# Patient Record
Sex: MALE | Race: WHITE | Employment: OTHER | URBAN - NONMETROPOLITAN AREA
[De-identification: names, ages, dates, MRNs, and addresses within clinical notes are randomized per-mention and may not be internally consistent; named-entity substitution may affect disease eponyms.]

---

## 2017-02-23 ENCOUNTER — TELEPHONE (OUTPATIENT)
Dept: CARDIOLOGY | Age: 47
End: 2017-02-23

## 2017-08-17 ENCOUNTER — TELEPHONE (OUTPATIENT)
Dept: PULMONOLOGY | Age: 47
End: 2017-08-17

## 2017-09-06 ENCOUNTER — NURSE TRIAGE (OUTPATIENT)
Dept: ADMINISTRATIVE | Age: 47
End: 2017-09-06

## 2017-10-26 DIAGNOSIS — A08.4 VIRAL GASTROENTERITIS: ICD-10-CM

## 2017-10-26 DIAGNOSIS — R10.13 DYSPEPSIA: ICD-10-CM

## 2017-10-26 DIAGNOSIS — J44.9 COPD (CHRONIC OBSTRUCTIVE PULMONARY DISEASE) (HCC): ICD-10-CM

## 2017-10-26 DIAGNOSIS — F31.9 BIPOLAR DISORDER (HCC): ICD-10-CM

## 2017-10-26 RX ORDER — ONDANSETRON 4 MG/1
4 TABLET, FILM COATED ORAL EVERY 8 HOURS PRN
Qty: 20 TABLET | Refills: 0 | OUTPATIENT
Start: 2017-10-26

## 2017-10-26 RX ORDER — ARIPIPRAZOLE 10 MG/1
10 TABLET ORAL NIGHTLY
Qty: 30 TABLET | Refills: 2 | OUTPATIENT
Start: 2017-10-26

## 2017-10-26 RX ORDER — ALBUTEROL SULFATE 90 UG/1
2 AEROSOL, METERED RESPIRATORY (INHALATION) EVERY 4 HOURS PRN
Qty: 1 INHALER | Refills: 11 | OUTPATIENT
Start: 2017-10-26

## 2017-10-26 RX ORDER — PANTOPRAZOLE SODIUM 40 MG/1
40 TABLET, DELAYED RELEASE ORAL
Qty: 14 TABLET | Refills: 0 | OUTPATIENT
Start: 2017-10-26

## 2017-11-02 ENCOUNTER — HOSPITAL ENCOUNTER (OUTPATIENT)
Dept: PULMONOLOGY | Age: 47
Discharge: HOME | End: 2017-11-02
Payer: MEDICARE

## 2017-11-02 DIAGNOSIS — J43.2 CENTRILOBULAR EMPHYSEMA (HCC): ICD-10-CM

## 2017-11-02 PROCEDURE — 94060 EVALUATION OF WHEEZING: CPT

## 2017-11-02 PROCEDURE — 94726 PLETHYSMOGRAPHY LUNG VOLUMES: CPT

## 2017-11-02 PROCEDURE — 94729 DIFFUSING CAPACITY: CPT

## 2017-11-08 ENCOUNTER — OFFICE VISIT (OUTPATIENT)
Dept: PULMONOLOGY | Age: 47
End: 2017-11-08
Payer: MEDICAID

## 2017-11-08 VITALS
HEIGHT: 66 IN | SYSTOLIC BLOOD PRESSURE: 92 MMHG | HEART RATE: 76 BPM | BODY MASS INDEX: 17.36 KG/M2 | OXYGEN SATURATION: 98 % | WEIGHT: 108 LBS | DIASTOLIC BLOOD PRESSURE: 62 MMHG | TEMPERATURE: 98.1 F

## 2017-11-08 DIAGNOSIS — J41.1 MUCOPURULENT CHRONIC BRONCHITIS (HCC): Primary | ICD-10-CM

## 2017-11-08 DIAGNOSIS — J45.40 MODERATE PERSISTENT ASTHMA WITHOUT COMPLICATION: ICD-10-CM

## 2017-11-08 PROCEDURE — 99213 OFFICE O/P EST LOW 20 MIN: CPT | Performed by: PHYSICIAN ASSISTANT

## 2017-11-08 RX ORDER — ALBUTEROL SULFATE 90 UG/1
2 AEROSOL, METERED RESPIRATORY (INHALATION) EVERY 4 HOURS PRN
Qty: 1 INHALER | Refills: 11 | Status: SHIPPED | OUTPATIENT
Start: 2017-11-08 | End: 2018-06-21 | Stop reason: SDUPTHER

## 2017-11-08 RX ORDER — BUDESONIDE AND FORMOTEROL FUMARATE DIHYDRATE 80; 4.5 UG/1; UG/1
2 AEROSOL RESPIRATORY (INHALATION) 2 TIMES DAILY
Qty: 1 INHALER | Refills: 11 | Status: SHIPPED | OUTPATIENT
Start: 2017-11-08 | End: 2018-06-21 | Stop reason: DRUGHIGH

## 2017-11-08 ASSESSMENT — ENCOUNTER SYMPTOMS
SHORTNESS OF BREATH: 1
EYES NEGATIVE: 1
COUGH: 1
HEMOPTYSIS: 0
NAUSEA: 0
WHEEZING: 1
SPUTUM PRODUCTION: 0
HEARTBURN: 0
SORE THROAT: 0
BACK PAIN: 0
VOMITING: 0
GASTROINTESTINAL NEGATIVE: 1

## 2017-11-08 NOTE — PROGRESS NOTES
[facility]    Patient: Elliotteva Hull, 49 y.o.   : 1970    Pt of Dr. Prince Arreola   Patient presents with    Shortness of Breath     emphysema 1 year f/u with pft 17        HPI  Luis Felipe Frankel is here for follow up for COPD. Last visit was a year ago. He is more SOB and wheezing. He is taking Spiriva with good benefit. He gets benefit from albuterol. Weight is still up and down. He is no longer following at [facility] for weight loss. He has not smoked in over a year. Progress History:   Since last visit any new medical issues? No  New ER or hospitlal visits? No  Any new or changes in medicines? No  Using inhalers? Yes Spiriva  Are they helpful?  Yes   Past Medical hx   PMH:  Past Medical History:   Diagnosis Date    Bipolar 1 [facility] ([facility] [address]     COPD (chronic obstructive pulmonary disease) (HCC)     GERD (gastroesophageal reflux disease)     IBS (irritable bowel syndrome)     Lung collapse     Left     SURGICAL HISTORY:  Past Surgical History:   Procedure Laterality Date    AORTO-SMA BYPASS GRAFT      CHOLECYSTECTOMY      COLONOSCOPY      ENDOSCOPY, COLON, DIAGNOSTIC      TONSILLECTOMY       SOCIAL HISTORY:  Social History   Substance Use Topics    Smoking status: Former Smoker     Packs/day: 0.50     Years: 36.00     Types: Cigarettes     Start date: 3/23/1975     Quit date: 10/6/2016    Smokeless tobacco: Never Used    Alcohol use No     ALLERGIES:No Known Allergies  FAMILY HISTORY:  Family History   Problem Relation Age of Onset    Liver Cancer Father     Cancer Father     Heart Disease Mother      CURRENT MEDICATIONS:  Current Outpatient Prescriptions   Medication Sig Dispense Refill    budesonide-formoterol (SYMBICORT) 80-4.5 MCG/ACT AERO Inhale 2 puffs into the lungs 2 times daily 1 Inhaler 11    tiotropium (SPIRIVA HANDIHALER) 18 MCG inhalation capsule Inhale 1 capsule into the lungs daily 30 capsule 11  albuterol sulfate HFA (PROAIR HFA) 108 (90 Base) MCG/ACT inhaler Inhale 2 puffs into the lungs every 4 hours as needed for Wheezing or Shortness of Breath 1 Inhaler 11    pantoprazole (PROTONIX) 40 MG tablet Take 1 tablet by mouth daily (with breakfast) 14 tablet 0    ondansetron (ZOFRAN) 4 MG tablet Take 1 tablet by mouth every 8 hours as needed for Nausea or Vomiting 20 tablet 0    diltiazem (CARDIZEM) 30 MG tablet Take 30 mg in morning then  titrate up to 30 mg twice daily      Acetaminophen 650 MG TABS Take 650 mg by mouth every 4 hours as needed. 120 tablet     metoclopramide (REGLAN) 5 MG tablet Take 5 mg by mouth 3 times daily (before meals).  amitriptyline (ELAVIL) 10 MG tablet Take 10 mg by mouth nightly.  vitamin E 400 UNIT capsule Take 400 Units by mouth 2 times daily.  multivitamin (THERAGRAN) per tablet Take 1 tablet by mouth daily.  ARIPiprazole (ABILIFY) 10 MG tablet Take 1 tablet by mouth nightly. 30 tablet 2    dicyclomine (BENTYL) 10 MG capsule Take 1 capsule by mouth every 6 hours as needed for Pain (cramps) for 12 doses. 12 capsule 0    sucralfate (CARAFATE) 1 GM tablet Take 1 g by mouth 3 times daily.  citalopram (CELEXA) 40 MG tablet Take 40 mg by mouth daily. No current facility-administered medications for this visit. Christiane DIAZ   Review of Systems   Constitutional: Negative. Negative for chills, fever and weight loss. HENT: Negative. Negative for congestion and sore throat. Eyes: Negative. Respiratory: Positive for cough, shortness of breath and wheezing. Negative for hemoptysis and sputum production. Cardiovascular: Negative. Negative for chest pain and leg swelling. Gastrointestinal: Negative. Negative for heartburn, nausea and vomiting. Genitourinary: Negative. Musculoskeletal: Negative. Negative for back pain and myalgias. Skin: Negative. Neurological: Negative.   Negative for dizziness, tremors, weakness and headaches. Psychiatric/Behavioral: Negative. All other systems reviewed and are negative. Physical exam   BP 92/62   Pulse 76   Temp 98.1 °F (36.7 °C)   Ht 5' 6\" (1.676 m)   Wt 108 lb (49 kg)   SpO2 98% Comment: room air at rest  BMI 17.43 kg/m²    Neck Circumference -     Mallampati -     Physical Exam   Constitutional: He is oriented to person, place, and time and well-developed, well-nourished, and in no distress. HENT:   Head: Normocephalic and atraumatic. Mouth/Throat: No oropharyngeal exudate. Eyes: Conjunctivae and EOM are normal. Pupils are equal, round, and reactive to light. Neck: Normal range of motion. Neck supple. Cardiovascular: Normal rate, regular rhythm and normal heart sounds. Exam reveals no friction rub. No murmur heard. Pulmonary/Chest: Effort normal. No respiratory distress. He has no wheezes. He has no rales. Diminished    Abdominal: Bowel sounds are normal. He exhibits no distension. There is no tenderness. Musculoskeletal: Normal range of motion. He exhibits no edema or tenderness. Neurological: He is alert and oriented to person, place, and time. Skin: Skin is warm and dry. No rash noted. Psychiatric: Affect and judgment normal.   Nursing note and vitals reviewed. Test results   PFT's- significant bronchodilator response, Air trapping              Assessment     1. Mucopurulent chronic bronchitis (HCC)  albuterol sulfate HFA (PROAIR HFA) 108 (90 Base) MCG/ACT inhaler   2.  Moderate persistent asthma without complication           Plan   He is still having symptoms despite Spiriva  Will add Symbicort to try to improve wheezing and dyspnea   Will see Maged Pryor back in: 6 months    Alannah Mckoen PA-C, [place]  11/8/2017

## 2018-04-29 ENCOUNTER — APPOINTMENT (OUTPATIENT)
Dept: GENERAL RADIOLOGY | Age: 48
End: 2018-04-29
Payer: COMMERCIAL

## 2018-04-29 ENCOUNTER — HOSPITAL ENCOUNTER (OUTPATIENT)
Age: 48
Setting detail: OBSERVATION
Discharge: HOME | End: 2018-04-30
Attending: FAMILY MEDICINE | Admitting: INTERNAL MEDICINE
Payer: COMMERCIAL

## 2018-04-29 DIAGNOSIS — R07.9 CHEST PAIN, UNSPECIFIED TYPE: Primary | ICD-10-CM

## 2018-04-29 PROBLEM — F41.9 ANXIETY: Status: ACTIVE | Noted: 2018-04-29

## 2018-04-29 LAB
ALBUMIN SERPL-MCNC: 4.2 G/DL (ref 3.5–5.1)
ALP BLD-CCNC: 115 U/L (ref 38–126)
ALT SERPL-CCNC: 16 U/L (ref 11–66)
ANION GAP SERPL CALCULATED.3IONS-SCNC: 12 MEQ/L (ref 8–16)
APTT: 33.4 SECONDS (ref 22–38)
AST SERPL-CCNC: 20 U/L (ref 5–40)
BASOPHILS # BLD: 0.3 %
BASOPHILS ABSOLUTE: 0 THOU/MM3 (ref 0–0.1)
BILIRUB SERPL-MCNC: 0.4 MG/DL (ref 0.3–1.2)
BILIRUBIN DIRECT: < 0.2 MG/DL (ref 0–0.3)
BUN BLDV-MCNC: 10 MG/DL (ref 7–22)
CALCIUM SERPL-MCNC: 9.4 MG/DL (ref 8.5–10.5)
CHLORIDE BLD-SCNC: 99 MEQ/L (ref 98–111)
CO2: 22 MEQ/L (ref 23–33)
CREAT SERPL-MCNC: 0.8 MG/DL (ref 0.4–1.2)
EKG ATRIAL RATE: 101 BPM
EKG P AXIS: 84 DEGREES
EKG P-R INTERVAL: 116 MS
EKG Q-T INTERVAL: 316 MS
EKG QRS DURATION: 82 MS
EKG QTC CALCULATION (BAZETT): 409 MS
EKG R AXIS: 91 DEGREES
EKG T AXIS: 70 DEGREES
EKG VENTRICULAR RATE: 101 BPM
EOSINOPHIL # BLD: 0.3 %
EOSINOPHILS ABSOLUTE: 0 THOU/MM3 (ref 0–0.4)
GFR SERPL CREATININE-BSD FRML MDRD: > 90 ML/MIN/1.73M2
GLUCOSE BLD-MCNC: 129 MG/DL (ref 70–108)
HCT VFR BLD CALC: 44.6 % (ref 42–52)
HEMOGLOBIN: 15.7 GM/DL (ref 14–18)
LACTIC ACID: 1.2 MMOL/L (ref 0.5–2.2)
LYMPHOCYTES # BLD: 18.4 %
LYMPHOCYTES ABSOLUTE: 2.1 THOU/MM3 (ref 1–4.8)
MACROCYTES: ABNORMAL
MCH RBC QN AUTO: 35.9 PG (ref 27–31)
MCHC RBC AUTO-ENTMCNC: 35.2 GM/DL (ref 33–37)
MCV RBC AUTO: 101.9 FL (ref 80–94)
MONOCYTES # BLD: 5.1 %
MONOCYTES ABSOLUTE: 0.6 THOU/MM3 (ref 0.4–1.3)
NUCLEATED RED BLOOD CELLS: 0 /100 WBC
OSMOLALITY CALCULATION: 267.1 MOSMOL/KG (ref 275–300)
PDW BLD-RTO: 12.9 % (ref 11.5–14.5)
PLATELET # BLD: 215 THOU/MM3 (ref 130–400)
PMV BLD AUTO: 7.4 FL (ref 7.4–10.4)
POTASSIUM REFLEX MAGNESIUM: 4.2 MEQ/L (ref 3.5–5.2)
PRO-BNP: 55.1 PG/ML (ref 0–450)
PROCALCITONIN: < 0.02 NG/ML (ref 0.01–0.09)
RBC # BLD: 4.38 MILL/MM3 (ref 4.7–6.1)
SEG NEUTROPHILS: 75.9 %
SEGMENTED NEUTROPHILS ABSOLUTE COUNT: 8.5 THOU/MM3 (ref 1.8–7.7)
SODIUM BLD-SCNC: 133 MEQ/L (ref 135–145)
TOTAL PROTEIN: 7.6 G/DL (ref 6.1–8)
TROPONIN T: < 0.01 NG/ML
TSH SERPL DL<=0.05 MIU/L-ACNC: 1.74 UIU/ML (ref 0.4–4.2)
WBC # BLD: 11.2 THOU/MM3 (ref 4.8–10.8)

## 2018-04-29 PROCEDURE — 96375 TX/PRO/DX INJ NEW DRUG ADDON: CPT

## 2018-04-29 PROCEDURE — 2580000003 HC RX 258: Performed by: FAMILY MEDICINE

## 2018-04-29 PROCEDURE — 83880 ASSAY OF NATRIURETIC PEPTIDE: CPT

## 2018-04-29 PROCEDURE — 6370000000 HC RX 637 (ALT 250 FOR IP): Performed by: INTERNAL MEDICINE

## 2018-04-29 PROCEDURE — 85025 COMPLETE CBC W/AUTO DIFF WBC: CPT

## 2018-04-29 PROCEDURE — 85730 THROMBOPLASTIN TIME PARTIAL: CPT

## 2018-04-29 PROCEDURE — G0378 HOSPITAL OBSERVATION PER HR: HCPCS

## 2018-04-29 PROCEDURE — 99285 EMERGENCY DEPT VISIT HI MDM: CPT

## 2018-04-29 PROCEDURE — 96374 THER/PROPH/DIAG INJ IV PUSH: CPT

## 2018-04-29 PROCEDURE — 2580000003 HC RX 258: Performed by: INTERNAL MEDICINE

## 2018-04-29 PROCEDURE — 6360000002 HC RX W HCPCS: Performed by: INTERNAL MEDICINE

## 2018-04-29 PROCEDURE — 94640 AIRWAY INHALATION TREATMENT: CPT

## 2018-04-29 PROCEDURE — 80048 BASIC METABOLIC PNL TOTAL CA: CPT

## 2018-04-29 PROCEDURE — 80076 HEPATIC FUNCTION PANEL: CPT

## 2018-04-29 PROCEDURE — 6370000000 HC RX 637 (ALT 250 FOR IP): Performed by: FAMILY MEDICINE

## 2018-04-29 PROCEDURE — 84443 ASSAY THYROID STIM HORMONE: CPT

## 2018-04-29 PROCEDURE — 84484 ASSAY OF TROPONIN QUANT: CPT

## 2018-04-29 PROCEDURE — 83605 ASSAY OF LACTIC ACID: CPT

## 2018-04-29 PROCEDURE — 84145 PROCALCITONIN (PCT): CPT

## 2018-04-29 PROCEDURE — 93005 ELECTROCARDIOGRAM TRACING: CPT | Performed by: FAMILY MEDICINE

## 2018-04-29 PROCEDURE — 99220 PR INITIAL OBSERVATION CARE/DAY 70 MINUTES: CPT | Performed by: INTERNAL MEDICINE

## 2018-04-29 PROCEDURE — 36415 COLL VENOUS BLD VENIPUNCTURE: CPT

## 2018-04-29 PROCEDURE — 96372 THER/PROPH/DIAG INJ SC/IM: CPT

## 2018-04-29 PROCEDURE — 71045 X-RAY EXAM CHEST 1 VIEW: CPT

## 2018-04-29 PROCEDURE — 6360000002 HC RX W HCPCS: Performed by: FAMILY MEDICINE

## 2018-04-29 RX ORDER — 0.9 % SODIUM CHLORIDE 0.9 %
500 INTRAVENOUS SOLUTION INTRAVENOUS ONCE
Status: COMPLETED | OUTPATIENT
Start: 2018-04-29 | End: 2018-04-29

## 2018-04-29 RX ORDER — ASPIRIN 81 MG/1
81 TABLET, CHEWABLE ORAL DAILY
Status: DISCONTINUED | OUTPATIENT
Start: 2018-04-30 | End: 2018-04-30 | Stop reason: HOSPADM

## 2018-04-29 RX ORDER — ALBUTEROL SULFATE 90 UG/1
2 AEROSOL, METERED RESPIRATORY (INHALATION) EVERY 4 HOURS PRN
Status: DISCONTINUED | OUTPATIENT
Start: 2018-04-29 | End: 2018-04-30 | Stop reason: HOSPADM

## 2018-04-29 RX ORDER — DICYCLOMINE HYDROCHLORIDE 10 MG/1
10 CAPSULE ORAL EVERY 6 HOURS PRN
Status: DISCONTINUED | OUTPATIENT
Start: 2018-04-29 | End: 2018-04-29 | Stop reason: ALTCHOICE

## 2018-04-29 RX ORDER — SODIUM CHLORIDE 0.9 % (FLUSH) 0.9 %
10 SYRINGE (ML) INJECTION PRN
Status: DISCONTINUED | OUTPATIENT
Start: 2018-04-29 | End: 2018-04-30 | Stop reason: HOSPADM

## 2018-04-29 RX ORDER — ARIPIPRAZOLE 10 MG/1
10 TABLET ORAL NIGHTLY
Status: DISCONTINUED | OUTPATIENT
Start: 2018-04-29 | End: 2018-04-30 | Stop reason: HOSPADM

## 2018-04-29 RX ORDER — SUCRALFATE 1 G/1
1 TABLET ORAL 3 TIMES DAILY
Status: DISCONTINUED | OUTPATIENT
Start: 2018-04-29 | End: 2018-04-29 | Stop reason: ALTCHOICE

## 2018-04-29 RX ORDER — METOCLOPRAMIDE 10 MG/1
5 TABLET ORAL
Status: DISCONTINUED | OUTPATIENT
Start: 2018-04-29 | End: 2018-04-30 | Stop reason: HOSPADM

## 2018-04-29 RX ORDER — ONDANSETRON 2 MG/ML
4 INJECTION INTRAMUSCULAR; INTRAVENOUS EVERY 6 HOURS PRN
Status: DISCONTINUED | OUTPATIENT
Start: 2018-04-29 | End: 2018-04-30 | Stop reason: HOSPADM

## 2018-04-29 RX ORDER — MORPHINE SULFATE 2 MG/ML
2 INJECTION, SOLUTION INTRAMUSCULAR; INTRAVENOUS ONCE
Status: COMPLETED | OUTPATIENT
Start: 2018-04-29 | End: 2018-04-29

## 2018-04-29 RX ORDER — ONDANSETRON 2 MG/ML
4 INJECTION INTRAMUSCULAR; INTRAVENOUS ONCE
Status: COMPLETED | OUTPATIENT
Start: 2018-04-29 | End: 2018-04-29

## 2018-04-29 RX ORDER — ACETAMINOPHEN 325 MG/1
650 TABLET ORAL EVERY 4 HOURS PRN
Status: DISCONTINUED | OUTPATIENT
Start: 2018-04-29 | End: 2018-04-30 | Stop reason: HOSPADM

## 2018-04-29 RX ORDER — ASPIRIN 81 MG/1
324 TABLET, CHEWABLE ORAL ONCE
Status: COMPLETED | OUTPATIENT
Start: 2018-04-29 | End: 2018-04-29

## 2018-04-29 RX ORDER — IPRATROPIUM BROMIDE AND ALBUTEROL SULFATE 2.5; .5 MG/3ML; MG/3ML
1 SOLUTION RESPIRATORY (INHALATION) ONCE
Status: COMPLETED | OUTPATIENT
Start: 2018-04-29 | End: 2018-04-29

## 2018-04-29 RX ORDER — MORPHINE SULFATE 2 MG/ML
2 INJECTION, SOLUTION INTRAMUSCULAR; INTRAVENOUS
Status: DISCONTINUED | OUTPATIENT
Start: 2018-04-29 | End: 2018-04-30 | Stop reason: HOSPADM

## 2018-04-29 RX ORDER — AMITRIPTYLINE HYDROCHLORIDE 10 MG/1
10 TABLET, FILM COATED ORAL NIGHTLY
Status: DISCONTINUED | OUTPATIENT
Start: 2018-04-29 | End: 2018-04-30 | Stop reason: HOSPADM

## 2018-04-29 RX ORDER — PANTOPRAZOLE SODIUM 40 MG/1
40 TABLET, DELAYED RELEASE ORAL
Status: DISCONTINUED | OUTPATIENT
Start: 2018-04-30 | End: 2018-04-30 | Stop reason: HOSPADM

## 2018-04-29 RX ORDER — CITALOPRAM 40 MG/1
40 TABLET ORAL DAILY
Status: DISCONTINUED | OUTPATIENT
Start: 2018-04-29 | End: 2018-04-30 | Stop reason: HOSPADM

## 2018-04-29 RX ORDER — SODIUM CHLORIDE 0.9 % (FLUSH) 0.9 %
10 SYRINGE (ML) INJECTION EVERY 12 HOURS SCHEDULED
Status: DISCONTINUED | OUTPATIENT
Start: 2018-04-29 | End: 2018-04-30 | Stop reason: HOSPADM

## 2018-04-29 RX ORDER — VITAMIN E 268 MG
400 CAPSULE ORAL 2 TIMES DAILY
Status: DISCONTINUED | OUTPATIENT
Start: 2018-04-29 | End: 2018-04-29 | Stop reason: ALTCHOICE

## 2018-04-29 RX ORDER — MULTIVITAMIN WITH FOLIC ACID 400 MCG
1 TABLET ORAL DAILY
Status: DISCONTINUED | OUTPATIENT
Start: 2018-04-30 | End: 2018-04-30 | Stop reason: HOSPADM

## 2018-04-29 RX ADMIN — ENOXAPARIN SODIUM 40 MG: 40 INJECTION SUBCUTANEOUS at 17:35

## 2018-04-29 RX ADMIN — IPRATROPIUM BROMIDE AND ALBUTEROL SULFATE 1 AMPULE: .5; 3 SOLUTION RESPIRATORY (INHALATION) at 12:40

## 2018-04-29 RX ADMIN — ONDANSETRON 4 MG: 2 INJECTION INTRAMUSCULAR; INTRAVENOUS at 10:55

## 2018-04-29 RX ADMIN — DILTIAZEM HYDROCHLORIDE 30 MG: 30 TABLET, FILM COATED ORAL at 20:33

## 2018-04-29 RX ADMIN — SODIUM CHLORIDE 500 ML: 9 INJECTION, SOLUTION INTRAVENOUS at 11:46

## 2018-04-29 RX ADMIN — METOCLOPRAMIDE 5 MG: 10 TABLET ORAL at 17:35

## 2018-04-29 RX ADMIN — Medication 10 ML: at 20:33

## 2018-04-29 RX ADMIN — ASPIRIN 81 MG 324 MG: 81 TABLET ORAL at 10:54

## 2018-04-29 RX ADMIN — IPRATROPIUM BROMIDE AND ALBUTEROL SULFATE 1 AMPULE: .5; 3 SOLUTION RESPIRATORY (INHALATION) at 12:32

## 2018-04-29 RX ADMIN — AMITRIPTYLINE HYDROCHLORIDE 10 MG: 10 TABLET, FILM COATED ORAL at 20:33

## 2018-04-29 RX ADMIN — MORPHINE SULFATE 2 MG: 2 INJECTION, SOLUTION INTRAMUSCULAR; INTRAVENOUS at 10:54

## 2018-04-29 RX ADMIN — ARIPIPRAZOLE 10 MG: 10 TABLET ORAL at 20:33

## 2018-04-29 RX ADMIN — Medication 2 PUFF: at 22:01

## 2018-04-29 ASSESSMENT — PAIN DESCRIPTION - PAIN TYPE: TYPE: ACUTE PAIN

## 2018-04-29 ASSESSMENT — PAIN DESCRIPTION - ORIENTATION: ORIENTATION: MID

## 2018-04-29 ASSESSMENT — ENCOUNTER SYMPTOMS
COUGH: 0
WHEEZING: 0
SHORTNESS OF BREATH: 1
RHINORRHEA: 0
SORE THROAT: 0
VOMITING: 1
BACK PAIN: 0
EYE REDNESS: 0
DIARRHEA: 0
NAUSEA: 1
ABDOMINAL PAIN: 0
EYE DISCHARGE: 0

## 2018-04-29 ASSESSMENT — PAIN SCALES - GENERAL
PAINLEVEL_OUTOF10: 6
PAINLEVEL_OUTOF10: 0
PAINLEVEL_OUTOF10: 4
PAINLEVEL_OUTOF10: 0
PAINLEVEL_OUTOF10: 6

## 2018-04-29 ASSESSMENT — HEART SCORE: ECG: 0

## 2018-04-29 ASSESSMENT — PAIN DESCRIPTION - LOCATION: LOCATION: CHEST

## 2018-04-30 ENCOUNTER — APPOINTMENT (OUTPATIENT)
Dept: NON INVASIVE DIAGNOSTICS | Age: 48
End: 2018-04-30
Payer: COMMERCIAL

## 2018-04-30 VITALS
TEMPERATURE: 98.1 F | HEART RATE: 90 BPM | RESPIRATION RATE: 18 BRPM | SYSTOLIC BLOOD PRESSURE: 108 MMHG | WEIGHT: 103.2 LBS | OXYGEN SATURATION: 94 % | HEIGHT: 66 IN | DIASTOLIC BLOOD PRESSURE: 64 MMHG | BODY MASS INDEX: 16.59 KG/M2

## 2018-04-30 PROBLEM — E43 SEVERE MALNUTRITION (HCC): Status: ACTIVE | Noted: 2018-04-30

## 2018-04-30 LAB
CHOLESTEROL, TOTAL: 169 MG/DL (ref 100–199)
HDLC SERPL-MCNC: 42 MG/DL
LDL CHOLESTEROL CALCULATED: 111 MG/DL
LV EF: 56 %
LVEF MODALITY: NORMAL
TRIGL SERPL-MCNC: 82 MG/DL (ref 0–199)

## 2018-04-30 PROCEDURE — 99217 PR OBSERVATION CARE DISCHARGE MANAGEMENT: CPT | Performed by: INTERNAL MEDICINE

## 2018-04-30 PROCEDURE — G0378 HOSPITAL OBSERVATION PER HR: HCPCS

## 2018-04-30 PROCEDURE — 80061 LIPID PANEL: CPT

## 2018-04-30 PROCEDURE — 6370000000 HC RX 637 (ALT 250 FOR IP): Performed by: INTERNAL MEDICINE

## 2018-04-30 PROCEDURE — A9500 TC99M SESTAMIBI: HCPCS | Performed by: INTERNAL MEDICINE

## 2018-04-30 PROCEDURE — 3430000000 HC RX DIAGNOSTIC RADIOPHARMACEUTICAL: Performed by: INTERNAL MEDICINE

## 2018-04-30 PROCEDURE — 78452 HT MUSCLE IMAGE SPECT MULT: CPT

## 2018-04-30 PROCEDURE — 2580000003 HC RX 258: Performed by: INTERNAL MEDICINE

## 2018-04-30 PROCEDURE — 36415 COLL VENOUS BLD VENIPUNCTURE: CPT

## 2018-04-30 PROCEDURE — 93017 CV STRESS TEST TRACING ONLY: CPT | Performed by: INTERNAL MEDICINE

## 2018-04-30 RX ORDER — ASPIRIN 81 MG/1
81 TABLET, CHEWABLE ORAL DAILY
Qty: 30 TABLET | Refills: 0 | Status: SHIPPED | OUTPATIENT
Start: 2018-05-01 | End: 2019-02-24

## 2018-04-30 RX ADMIN — ASPIRIN 81 MG 81 MG: 81 TABLET ORAL at 09:40

## 2018-04-30 RX ADMIN — DILTIAZEM HYDROCHLORIDE 30 MG: 30 TABLET, FILM COATED ORAL at 09:40

## 2018-04-30 RX ADMIN — METOCLOPRAMIDE 5 MG: 10 TABLET ORAL at 09:40

## 2018-04-30 RX ADMIN — Medication 10.5 MILLICURIE: at 07:25

## 2018-04-30 RX ADMIN — THERA TABS 1 TABLET: TAB at 09:40

## 2018-04-30 RX ADMIN — Medication 10 ML: at 09:41

## 2018-04-30 RX ADMIN — Medication 35 MILLICURIE: at 08:15

## 2018-04-30 RX ADMIN — CITALOPRAM 40 MG: 40 TABLET, FILM COATED ORAL at 09:40

## 2018-04-30 RX ADMIN — METOCLOPRAMIDE 5 MG: 10 TABLET ORAL at 12:59

## 2018-04-30 RX ADMIN — PANTOPRAZOLE SODIUM 40 MG: 40 TABLET, DELAYED RELEASE ORAL at 09:40

## 2018-04-30 ASSESSMENT — PAIN SCALES - GENERAL
PAINLEVEL_OUTOF10: 0

## 2018-05-01 ENCOUNTER — TELEPHONE (OUTPATIENT)
Dept: FAMILY MEDICINE CLINIC | Age: 48
End: 2018-05-01

## 2018-05-02 ENCOUNTER — TELEPHONE (OUTPATIENT)
Dept: FAMILY MEDICINE CLINIC | Age: 48
End: 2018-05-02

## 2018-05-21 ENCOUNTER — OFFICE VISIT (OUTPATIENT)
Dept: FAMILY MEDICINE CLINIC | Age: 48
End: 2018-05-21
Payer: MEDICARE

## 2018-05-21 VITALS
HEART RATE: 84 BPM | RESPIRATION RATE: 20 BRPM | BODY MASS INDEX: 16.62 KG/M2 | SYSTOLIC BLOOD PRESSURE: 92 MMHG | TEMPERATURE: 98.2 F | DIASTOLIC BLOOD PRESSURE: 62 MMHG | WEIGHT: 103 LBS

## 2018-05-21 DIAGNOSIS — K21.9 GASTROESOPHAGEAL REFLUX DISEASE, ESOPHAGITIS PRESENCE NOT SPECIFIED: ICD-10-CM

## 2018-05-21 DIAGNOSIS — F31.9 BIPOLAR AFFECTIVE DISORDER, REMISSION STATUS UNSPECIFIED (HCC): ICD-10-CM

## 2018-05-21 DIAGNOSIS — K75.81 NASH (NONALCOHOLIC STEATOHEPATITIS): ICD-10-CM

## 2018-05-21 DIAGNOSIS — R06.02 SOB (SHORTNESS OF BREATH): ICD-10-CM

## 2018-05-21 DIAGNOSIS — I95.9 HYPOTENSION, UNSPECIFIED HYPOTENSION TYPE: ICD-10-CM

## 2018-05-21 DIAGNOSIS — J44.9 CHRONIC OBSTRUCTIVE PULMONARY DISEASE, UNSPECIFIED COPD TYPE (HCC): ICD-10-CM

## 2018-05-21 DIAGNOSIS — R07.9 CHEST PAIN, UNSPECIFIED TYPE: Primary | ICD-10-CM

## 2018-05-21 PROCEDURE — 99214 OFFICE O/P EST MOD 30 MIN: CPT | Performed by: FAMILY MEDICINE

## 2018-05-21 ASSESSMENT — PATIENT HEALTH QUESTIONNAIRE - PHQ9
2. FEELING DOWN, DEPRESSED OR HOPELESS: 0
SUM OF ALL RESPONSES TO PHQ QUESTIONS 1-9: 0
1. LITTLE INTEREST OR PLEASURE IN DOING THINGS: 0
SUM OF ALL RESPONSES TO PHQ9 QUESTIONS 1 & 2: 0

## 2018-05-21 ASSESSMENT — ENCOUNTER SYMPTOMS
RESPIRATORY NEGATIVE: 1
GASTROINTESTINAL NEGATIVE: 1

## 2018-06-21 ENCOUNTER — OFFICE VISIT (OUTPATIENT)
Dept: PULMONOLOGY | Age: 48
End: 2018-06-21
Payer: MEDICAID

## 2018-06-21 VITALS
OXYGEN SATURATION: 98 % | WEIGHT: 100.2 LBS | DIASTOLIC BLOOD PRESSURE: 64 MMHG | SYSTOLIC BLOOD PRESSURE: 96 MMHG | HEIGHT: 66 IN | HEART RATE: 81 BPM | TEMPERATURE: 97.3 F | BODY MASS INDEX: 16.1 KG/M2

## 2018-06-21 DIAGNOSIS — J44.9 CHRONIC OBSTRUCTIVE PULMONARY DISEASE, UNSPECIFIED COPD TYPE (HCC): Primary | ICD-10-CM

## 2018-06-21 DIAGNOSIS — J41.1 MUCOPURULENT CHRONIC BRONCHITIS (HCC): ICD-10-CM

## 2018-06-21 DIAGNOSIS — R06.02 SHORTNESS OF BREATH: ICD-10-CM

## 2018-06-21 DIAGNOSIS — F17.219 CIGARETTE NICOTINE DEPENDENCE WITH NICOTINE-INDUCED DISORDER: ICD-10-CM

## 2018-06-21 PROCEDURE — 99214 OFFICE O/P EST MOD 30 MIN: CPT | Performed by: NURSE PRACTITIONER

## 2018-06-21 RX ORDER — BUDESONIDE AND FORMOTEROL FUMARATE DIHYDRATE 160; 4.5 UG/1; UG/1
2 AEROSOL RESPIRATORY (INHALATION) 2 TIMES DAILY
Qty: 1 INHALER | Refills: 11 | Status: SHIPPED | OUTPATIENT
Start: 2018-06-21 | End: 2022-09-12 | Stop reason: ALTCHOICE

## 2018-06-21 RX ORDER — VARENICLINE TARTRATE 1 MG/1
1 TABLET, FILM COATED ORAL 2 TIMES DAILY
Qty: 60 TABLET | Refills: 11 | Status: SHIPPED | OUTPATIENT
Start: 2018-06-21 | End: 2022-09-12 | Stop reason: ALTCHOICE

## 2018-06-21 RX ORDER — ALBUTEROL SULFATE 90 UG/1
2 AEROSOL, METERED RESPIRATORY (INHALATION) EVERY 4 HOURS PRN
Qty: 1 INHALER | Refills: 11 | Status: SHIPPED | OUTPATIENT
Start: 2018-06-21 | End: 2019-02-24 | Stop reason: ALTCHOICE

## 2018-06-21 RX ORDER — VARENICLINE TARTRATE 25 MG
KIT ORAL
Qty: 53 TABLET | Refills: 0 | Status: SHIPPED | OUTPATIENT
Start: 2018-06-21 | End: 2022-09-12 | Stop reason: ALTCHOICE

## 2018-07-02 ENCOUNTER — TELEPHONE (OUTPATIENT)
Dept: PULMONOLOGY | Age: 48
End: 2018-07-02

## 2018-10-18 ENCOUNTER — TELEPHONE (OUTPATIENT)
Dept: PULMONOLOGY | Age: 48
End: 2018-10-18

## 2019-02-19 ENCOUNTER — TELEPHONE (OUTPATIENT)
Dept: FAMILY MEDICINE CLINIC | Age: 49
End: 2019-02-19

## 2019-02-24 ENCOUNTER — HOSPITAL ENCOUNTER (EMERGENCY)
Age: 49
Discharge: HOME | End: 2019-02-24
Payer: MEDICARE

## 2019-02-24 ENCOUNTER — APPOINTMENT (OUTPATIENT)
Dept: GENERAL RADIOLOGY | Age: 49
End: 2019-02-24
Payer: MEDICARE

## 2019-02-24 VITALS
WEIGHT: 105 LBS | DIASTOLIC BLOOD PRESSURE: 80 MMHG | BODY MASS INDEX: 16.48 KG/M2 | HEART RATE: 75 BPM | HEIGHT: 67 IN | OXYGEN SATURATION: 98 % | RESPIRATION RATE: 17 BRPM | TEMPERATURE: 97.4 F | SYSTOLIC BLOOD PRESSURE: 111 MMHG

## 2019-02-24 DIAGNOSIS — R07.9 CHEST PAIN, UNSPECIFIED TYPE: Primary | ICD-10-CM

## 2019-02-24 DIAGNOSIS — J11.1 INFLUENZA: ICD-10-CM

## 2019-02-24 LAB
ANION GAP SERPL CALCULATED.3IONS-SCNC: 16 MEQ/L (ref 8–16)
BASOPHILS # BLD: 0.4 %
BASOPHILS ABSOLUTE: 0 THOU/MM3 (ref 0–0.1)
BUN BLDV-MCNC: 10 MG/DL (ref 7–22)
CALCIUM SERPL-MCNC: 9.2 MG/DL (ref 8.5–10.5)
CHLORIDE BLD-SCNC: 98 MEQ/L (ref 98–111)
CO2: 20 MEQ/L (ref 23–33)
CREAT SERPL-MCNC: 0.9 MG/DL (ref 0.4–1.2)
D-DIMER QUANTITATIVE: < 215 NG/ML FEU (ref 0–500)
EKG ATRIAL RATE: 72 BPM
EKG P AXIS: 78 DEGREES
EKG P-R INTERVAL: 118 MS
EKG Q-T INTERVAL: 380 MS
EKG QRS DURATION: 86 MS
EKG QTC CALCULATION (BAZETT): 416 MS
EKG R AXIS: 86 DEGREES
EKG T AXIS: 71 DEGREES
EKG VENTRICULAR RATE: 72 BPM
EOSINOPHIL # BLD: 1.3 %
EOSINOPHILS ABSOLUTE: 0.1 THOU/MM3 (ref 0–0.4)
ERYTHROCYTE [DISTWIDTH] IN BLOOD BY AUTOMATED COUNT: 12.4 % (ref 11.5–14.5)
ERYTHROCYTE [DISTWIDTH] IN BLOOD BY AUTOMATED COUNT: 46.4 FL (ref 35–45)
FLU A ANTIGEN: POSITIVE
FLU B ANTIGEN: NEGATIVE
GFR SERPL CREATININE-BSD FRML MDRD: 89 ML/MIN/1.73M2
GLUCOSE BLD-MCNC: 194 MG/DL (ref 70–108)
HCT VFR BLD CALC: 44.5 % (ref 42–52)
HEMOGLOBIN: 15.5 GM/DL (ref 14–18)
IMMATURE GRANS (ABS): 0.04 THOU/MM3 (ref 0–0.07)
IMMATURE GRANULOCYTES: 0.5 %
LYMPHOCYTES # BLD: 23.3 %
LYMPHOCYTES ABSOLUTE: 2 THOU/MM3 (ref 1–4.8)
MAGNESIUM: 2 MG/DL (ref 1.6–2.4)
MCH RBC QN AUTO: 35.1 PG (ref 26–33)
MCHC RBC AUTO-ENTMCNC: 34.8 GM/DL (ref 32.2–35.5)
MCV RBC AUTO: 100.7 FL (ref 80–94)
MONOCYTES # BLD: 5.2 %
MONOCYTES ABSOLUTE: 0.4 THOU/MM3 (ref 0.4–1.3)
NUCLEATED RED BLOOD CELLS: 0 /100 WBC
OSMOLALITY CALCULATION: 272.6 MOSMOL/KG (ref 275–300)
PLATELET # BLD: 222 THOU/MM3 (ref 130–400)
PMV BLD AUTO: 9 FL (ref 9.4–12.4)
POTASSIUM SERPL-SCNC: 3.9 MEQ/L (ref 3.5–5.2)
PRO-BNP: 33.1 PG/ML (ref 0–450)
RBC # BLD: 4.42 MILL/MM3 (ref 4.7–6.1)
SEG NEUTROPHILS: 69.3 %
SEGMENTED NEUTROPHILS ABSOLUTE COUNT: 5.9 THOU/MM3 (ref 1.8–7.7)
SODIUM BLD-SCNC: 134 MEQ/L (ref 135–145)
TROPONIN T: < 0.01 NG/ML
TROPONIN T: < 0.01 NG/ML
WBC # BLD: 8.5 THOU/MM3 (ref 4.8–10.8)

## 2019-02-24 PROCEDURE — 6370000000 HC RX 637 (ALT 250 FOR IP): Performed by: PHYSICIAN ASSISTANT

## 2019-02-24 PROCEDURE — 84484 ASSAY OF TROPONIN QUANT: CPT

## 2019-02-24 PROCEDURE — 93005 ELECTROCARDIOGRAM TRACING: CPT | Performed by: PHYSICIAN ASSISTANT

## 2019-02-24 PROCEDURE — 99285 EMERGENCY DEPT VISIT HI MDM: CPT

## 2019-02-24 PROCEDURE — 85025 COMPLETE CBC W/AUTO DIFF WBC: CPT

## 2019-02-24 PROCEDURE — 83735 ASSAY OF MAGNESIUM: CPT

## 2019-02-24 PROCEDURE — 93010 ELECTROCARDIOGRAM REPORT: CPT | Performed by: INTERNAL MEDICINE

## 2019-02-24 PROCEDURE — 85379 FIBRIN DEGRADATION QUANT: CPT

## 2019-02-24 PROCEDURE — 71046 X-RAY EXAM CHEST 2 VIEWS: CPT

## 2019-02-24 PROCEDURE — 87804 INFLUENZA ASSAY W/OPTIC: CPT

## 2019-02-24 PROCEDURE — 94640 AIRWAY INHALATION TREATMENT: CPT

## 2019-02-24 PROCEDURE — 83880 ASSAY OF NATRIURETIC PEPTIDE: CPT

## 2019-02-24 PROCEDURE — 80048 BASIC METABOLIC PNL TOTAL CA: CPT

## 2019-02-24 PROCEDURE — 36415 COLL VENOUS BLD VENIPUNCTURE: CPT

## 2019-02-24 RX ORDER — PREDNISONE 20 MG/1
TABLET ORAL
Qty: 15 TABLET | Refills: 0 | Status: SHIPPED | OUTPATIENT
Start: 2019-02-24 | End: 2021-04-13

## 2019-02-24 RX ORDER — PREDNISONE 20 MG/1
60 TABLET ORAL ONCE
Status: COMPLETED | OUTPATIENT
Start: 2019-02-24 | End: 2019-02-24

## 2019-02-24 RX ORDER — IPRATROPIUM BROMIDE AND ALBUTEROL SULFATE 2.5; .5 MG/3ML; MG/3ML
1 SOLUTION RESPIRATORY (INHALATION) ONCE
Status: COMPLETED | OUTPATIENT
Start: 2019-02-24 | End: 2019-02-24

## 2019-02-24 RX ORDER — ALBUTEROL SULFATE 90 UG/1
2 AEROSOL, METERED RESPIRATORY (INHALATION) EVERY 4 HOURS PRN
Qty: 1 INHALER | Refills: 0 | Status: SHIPPED | OUTPATIENT
Start: 2019-02-24 | End: 2022-09-12

## 2019-02-24 RX ADMIN — IPRATROPIUM BROMIDE AND ALBUTEROL SULFATE 1 AMPULE: .5; 3 SOLUTION RESPIRATORY (INHALATION) at 09:44

## 2019-02-24 RX ADMIN — PREDNISONE 60 MG: 20 TABLET ORAL at 09:34

## 2019-02-24 ASSESSMENT — ENCOUNTER SYMPTOMS
CONSTIPATION: 0
WHEEZING: 0
COUGH: 0
BACK PAIN: 0
RHINORRHEA: 0
SORE THROAT: 0
BLOOD IN STOOL: 0
SHORTNESS OF BREATH: 1
VOMITING: 0
ABDOMINAL PAIN: 0
DIARRHEA: 0
NAUSEA: 0

## 2019-02-24 ASSESSMENT — HEART SCORE: ECG: 0

## 2019-02-25 ENCOUNTER — PATIENT MESSAGE (OUTPATIENT)
Dept: FAMILY MEDICINE CLINIC | Age: 49
End: 2019-02-25

## 2019-05-09 ENCOUNTER — HOSPITAL ENCOUNTER (EMERGENCY)
Age: 49
Discharge: HOME | End: 2019-05-09
Payer: MEDICARE

## 2019-05-09 VITALS
TEMPERATURE: 98 F | SYSTOLIC BLOOD PRESSURE: 129 MMHG | DIASTOLIC BLOOD PRESSURE: 86 MMHG | OXYGEN SATURATION: 99 % | HEIGHT: 66 IN | HEART RATE: 67 BPM | WEIGHT: 105 LBS | RESPIRATION RATE: 18 BRPM | BODY MASS INDEX: 16.88 KG/M2

## 2019-05-09 DIAGNOSIS — R11.2 NON-INTRACTABLE VOMITING WITH NAUSEA, UNSPECIFIED VOMITING TYPE: Primary | ICD-10-CM

## 2019-05-09 DIAGNOSIS — R10.13 DYSPEPSIA: ICD-10-CM

## 2019-05-09 LAB
ALBUMIN SERPL-MCNC: 3.7 G/DL (ref 3.5–5.1)
ALP BLD-CCNC: 132 U/L (ref 38–126)
ALT SERPL-CCNC: 13 U/L (ref 11–66)
ANION GAP SERPL CALCULATED.3IONS-SCNC: 13 MEQ/L (ref 8–16)
AST SERPL-CCNC: 18 U/L (ref 5–40)
BASOPHILS # BLD: 0.3 %
BASOPHILS ABSOLUTE: 0 THOU/MM3 (ref 0–0.1)
BILIRUB SERPL-MCNC: 0.4 MG/DL (ref 0.3–1.2)
BILIRUBIN DIRECT: < 0.2 MG/DL (ref 0–0.3)
BUN BLDV-MCNC: 6 MG/DL (ref 7–22)
CALCIUM SERPL-MCNC: 9.5 MG/DL (ref 8.5–10.5)
CHLORIDE BLD-SCNC: 101 MEQ/L (ref 98–111)
CO2: 23 MEQ/L (ref 23–33)
CREAT SERPL-MCNC: 0.9 MG/DL (ref 0.4–1.2)
EKG ATRIAL RATE: 72 BPM
EKG P AXIS: 37 DEGREES
EKG P-R INTERVAL: 116 MS
EKG Q-T INTERVAL: 382 MS
EKG QRS DURATION: 82 MS
EKG QTC CALCULATION (BAZETT): 418 MS
EKG R AXIS: 80 DEGREES
EKG T AXIS: 68 DEGREES
EKG VENTRICULAR RATE: 72 BPM
EOSINOPHIL # BLD: 1.7 %
EOSINOPHILS ABSOLUTE: 0.1 THOU/MM3 (ref 0–0.4)
ERYTHROCYTE [DISTWIDTH] IN BLOOD BY AUTOMATED COUNT: 13.2 % (ref 11.5–14.5)
ERYTHROCYTE [DISTWIDTH] IN BLOOD BY AUTOMATED COUNT: 49 FL (ref 35–45)
GFR SERPL CREATININE-BSD FRML MDRD: 89 ML/MIN/1.73M2
GLUCOSE BLD-MCNC: 89 MG/DL (ref 70–108)
HCT VFR BLD CALC: 44.5 % (ref 42–52)
HEMOGLOBIN: 15.4 GM/DL (ref 14–18)
IMMATURE GRANS (ABS): 0.02 THOU/MM3 (ref 0–0.07)
IMMATURE GRANULOCYTES: 0.3 %
LIPASE: 15.5 U/L (ref 5.6–51.3)
LYMPHOCYTES # BLD: 33.8 %
LYMPHOCYTES ABSOLUTE: 2.4 THOU/MM3 (ref 1–4.8)
MCH RBC QN AUTO: 34.8 PG (ref 26–33)
MCHC RBC AUTO-ENTMCNC: 34.6 GM/DL (ref 32.2–35.5)
MCV RBC AUTO: 100.5 FL (ref 80–94)
MONOCYTES # BLD: 7 %
MONOCYTES ABSOLUTE: 0.5 THOU/MM3 (ref 0.4–1.3)
NUCLEATED RED BLOOD CELLS: 0 /100 WBC
OSMOLALITY CALCULATION: 270.9 MOSMOL/KG (ref 275–300)
PLATELET # BLD: 240 THOU/MM3 (ref 130–400)
PMV BLD AUTO: 9.1 FL (ref 9.4–12.4)
POTASSIUM SERPL-SCNC: 3.8 MEQ/L (ref 3.5–5.2)
RBC # BLD: 4.43 MILL/MM3 (ref 4.7–6.1)
SEG NEUTROPHILS: 56.9 %
SEGMENTED NEUTROPHILS ABSOLUTE COUNT: 4.1 THOU/MM3 (ref 1.8–7.7)
SODIUM BLD-SCNC: 137 MEQ/L (ref 135–145)
TOTAL PROTEIN: 7.2 G/DL (ref 6.1–8)
TROPONIN T: < 0.01 NG/ML
WBC # BLD: 7.2 THOU/MM3 (ref 4.8–10.8)

## 2019-05-09 PROCEDURE — 2580000003 HC RX 258: Performed by: NURSE PRACTITIONER

## 2019-05-09 PROCEDURE — 96374 THER/PROPH/DIAG INJ IV PUSH: CPT

## 2019-05-09 PROCEDURE — 36415 COLL VENOUS BLD VENIPUNCTURE: CPT

## 2019-05-09 PROCEDURE — 6360000002 HC RX W HCPCS: Performed by: NURSE PRACTITIONER

## 2019-05-09 PROCEDURE — 84484 ASSAY OF TROPONIN QUANT: CPT

## 2019-05-09 PROCEDURE — 80053 COMPREHEN METABOLIC PANEL: CPT

## 2019-05-09 PROCEDURE — 93010 ELECTROCARDIOGRAM REPORT: CPT | Performed by: NUCLEAR MEDICINE

## 2019-05-09 PROCEDURE — 6370000000 HC RX 637 (ALT 250 FOR IP): Performed by: NURSE PRACTITIONER

## 2019-05-09 PROCEDURE — 85025 COMPLETE CBC W/AUTO DIFF WBC: CPT

## 2019-05-09 PROCEDURE — 99284 EMERGENCY DEPT VISIT MOD MDM: CPT

## 2019-05-09 PROCEDURE — 83690 ASSAY OF LIPASE: CPT

## 2019-05-09 PROCEDURE — 93005 ELECTROCARDIOGRAM TRACING: CPT | Performed by: NURSE PRACTITIONER

## 2019-05-09 PROCEDURE — C9113 INJ PANTOPRAZOLE SODIUM, VIA: HCPCS | Performed by: NURSE PRACTITIONER

## 2019-05-09 PROCEDURE — 82248 BILIRUBIN DIRECT: CPT

## 2019-05-09 RX ORDER — SUCRALFATE 1 G/1
1 TABLET ORAL 4 TIMES DAILY
Qty: 120 TABLET | Refills: 0 | Status: SHIPPED | OUTPATIENT
Start: 2019-05-09 | End: 2022-09-06

## 2019-05-09 RX ORDER — SUCRALFATE 1 G/1
1 TABLET ORAL ONCE
Status: COMPLETED | OUTPATIENT
Start: 2019-05-09 | End: 2019-05-09

## 2019-05-09 RX ORDER — PANTOPRAZOLE SODIUM 40 MG/10ML
40 INJECTION, POWDER, LYOPHILIZED, FOR SOLUTION INTRAVENOUS ONCE
Status: COMPLETED | OUTPATIENT
Start: 2019-05-09 | End: 2019-05-09

## 2019-05-09 RX ORDER — RANITIDINE 150 MG/1
150 TABLET ORAL 2 TIMES DAILY
Qty: 60 TABLET | Refills: 0 | Status: SHIPPED | OUTPATIENT
Start: 2019-05-09 | End: 2022-09-06

## 2019-05-09 RX ORDER — 0.9 % SODIUM CHLORIDE 0.9 %
1000 INTRAVENOUS SOLUTION INTRAVENOUS ONCE
Status: COMPLETED | OUTPATIENT
Start: 2019-05-09 | End: 2019-05-09

## 2019-05-09 RX ADMIN — PANTOPRAZOLE SODIUM 40 MG: 40 INJECTION, POWDER, FOR SOLUTION INTRAVENOUS at 18:24

## 2019-05-09 RX ADMIN — SODIUM CHLORIDE 1000 ML: 9 INJECTION, SOLUTION INTRAVENOUS at 18:24

## 2019-05-09 RX ADMIN — LIDOCAINE HYDROCHLORIDE: 20 SOLUTION ORAL; TOPICAL at 18:24

## 2019-05-09 RX ADMIN — SUCRALFATE 1 G: 1 TABLET ORAL at 18:24

## 2019-05-09 ASSESSMENT — ENCOUNTER SYMPTOMS
VOMITING: 1
ABDOMINAL PAIN: 1
BACK PAIN: 0
DIARRHEA: 0
SORE THROAT: 0
COUGH: 0
PHOTOPHOBIA: 0
BLOOD IN STOOL: 0
VOICE CHANGE: 0
RHINORRHEA: 0
SINUS PRESSURE: 0
EYE REDNESS: 0
WHEEZING: 0
NAUSEA: 1
SHORTNESS OF BREATH: 0
CONSTIPATION: 0
COLOR CHANGE: 0
ABDOMINAL DISTENTION: 0
CHEST TIGHTNESS: 0

## 2019-05-09 ASSESSMENT — PAIN DESCRIPTION - ORIENTATION: ORIENTATION: MID

## 2019-05-09 ASSESSMENT — PAIN DESCRIPTION - LOCATION: LOCATION: ABDOMEN

## 2019-05-09 ASSESSMENT — PAIN DESCRIPTION - PAIN TYPE: TYPE: ACUTE PAIN

## 2019-05-09 ASSESSMENT — PAIN SCALES - GENERAL: PAINLEVEL_OUTOF10: 5

## 2019-05-09 NOTE — ED PROVIDER NOTES
[facility] Emergency Department    CHIEF COMPLAINT       Chief Complaint   Patient presents with    Emesis       Nurses Notes reviewed and I agree except as noted in the HPI. HISTORY OF PRESENT ILLNESS    Angela Amor karsten 47 y.o. male with a history of GERD, IBS, and HAYES who presents to the ED for evaluation of nausea and emesis onset yesterday. Patient reports that his symptoms have worsened significantly today. He also reports diaphoresis with bowel movements. Patient states that he normally does not eat much and denies eating any food that may have caused this. Denies fever, urinary symptoms, or sick contacts. He reports taking daily medications for his abdominal issues but upon further review patient's chart does not reflect this. Patient reports past cholecystectomy and SMA syndrome. There are no other complaints at this time. Pain description:  Onset: yesterday  Location: abdomen  Duration: constant  Character: aching  Aggravating factors: none  Radiation: none  Timing: n/a  Severity: 5/10    Experienced previously: no     HPI was provided by the patient. REVIEW OF SYSTEMS     Review of Systems   Constitutional: Negative for appetite change, chills, diaphoresis, fatigue, fever and unexpected weight change. HENT: Negative for congestion, hearing loss, postnasal drip, rhinorrhea, sinus pressure, sore throat and voice change. Eyes: Negative for photophobia, redness and visual disturbance. Respiratory: Negative for cough, chest tightness, shortness of breath and wheezing. Cardiovascular: Negative for chest pain and palpitations. Gastrointestinal: Positive for abdominal pain, nausea and vomiting. Negative for abdominal distention, blood in stool, constipation and diarrhea. Endocrine: Negative for cold intolerance, heat intolerance, polydipsia, polyphagia and polyuria. Genitourinary: Negative for decreased urine volume, difficulty urinating, dysuria, flank pain and frequency. Days per week: Not on file     Minutes per session: Not on file    Stress: Not on file   Relationships    Social connections:     Talks on phone: Not on file     Gets together: Not on file     Attends Mandaen service: Not on file     Active member of club or organization: Not on file     Attends meetings of clubs or organizations: Not on file     Relationship status: Not on file    Intimate partner violence:     Fear of current or ex partner: Not on file     Emotionally abused: Not on file     Physically abused: Not on file     Forced sexual activity: Not on file   Other Topics Concern    Not on file   Social History Narrative    Not on file       PHYSICAL EXAM     INITIAL VITALS:  height is 5' 6\" (1.676 m) and weight is 105 lb (47.6 kg). His oral temperature is 98 °F (36.7 °C). His blood pressure is 129/86 and his pulse is 67. His respiration is 18 and oxygen saturation is 99%. Physical Exam   Constitutional: He is oriented to person, place, and time. He appears well-developed and well-nourished. HENT:   Head: Normocephalic. Right Ear: External ear normal.   Left Ear: External ear normal.   Nose: Nose normal.   Mouth/Throat: Uvula is midline and oropharynx is clear and moist.   Eyes: Conjunctivae and EOM are normal.   Neck: Normal range of motion. Neck supple. Cardiovascular: Normal rate, regular rhythm, S1 normal, S2 normal, normal heart sounds and intact distal pulses. Pulmonary/Chest: Effort normal and breath sounds normal. No respiratory distress. He exhibits no tenderness. Abdominal: Soft. Normal appearance and bowel sounds are normal. He exhibits no distension. There is generalized tenderness. Musculoskeletal: Normal range of motion. Neurological: He is alert and oriented to person, place, and time. Skin: Skin is warm and dry. No rash noted. No erythema. No pallor.    Psychiatric: His behavior is normal. Judgment and thought content normal.   Nursing note and vitals reviewed. DIFFERENTIAL DIAGNOSIS:   Gastritis, viral illness,     DIAGNOSTIC RESULTS     EKG: All EKG's are interpreted by the Emergency Department Physician who eithersigns or Co-signs this chart in the absence of a cardiologist.    None      RADIOLOGY: non-plainfilm images(s) such as CT, Ultrasound and MRI are read by the radiologist.  Plain radiographic images are visualized and preliminarily interpreted by the emergency physician unless otherwise stated below.   No orders to display         LABS:   Labs Reviewed   CBC WITH AUTO DIFFERENTIAL - Abnormal; Notable for the following components:       Result Value    RBC 4.43 (*)     .5 (*)     MCH 34.8 (*)     RDW-SD 49.0 (*)     MPV 9.1 (*)     All other components within normal limits   BASIC METABOLIC PANEL - Abnormal; Notable for the following components:    BUN 6 (*)     All other components within normal limits   HEPATIC FUNCTION PANEL - Abnormal; Notable for the following components:    Alkaline Phosphatase 132 (*)     All other components within normal limits   GLOMERULAR FILTRATION RATE, ESTIMATED - Abnormal; Notable for the following components:    Est, Glom Filt Rate 89 (*)     All other components within normal limits   OSMOLALITY - Abnormal; Notable for the following components:    Osmolality Calc 270.9 (*)     All other components within normal limits   LIPASE   TROPONIN   ANION GAP       EMERGENCY DEPARTMENT COURSE:   Vitals:    Vitals:    05/09/19 1743 05/09/19 1850   BP: 136/89 129/86   Pulse: 89 67   Resp: 18 18   Temp: 98 °F (36.7 °C)    TempSrc: Oral    SpO2: 96% 99%   Weight: 105 lb (47.6 kg)    Height: 5' 6\" (1.676 m)        MDM    Medications   0.9 % sodium chloride bolus (0 mLs Intravenous Stopped 5/9/19 1915)   sucralfate (CARAFATE) tablet 1 g (1 g Oral Given 5/9/19 1824)   aluminum & magnesium hydroxide-simethicone (MAALOX) 30 mL, lidocaine viscous (XYLOCAINE) 5 mL (GI COCKTAIL) ( Oral Given 5/9/19 1824)   pantoprazole (PROTONIX) Efforts were made to edit the dictations but occasionally words are mis-transcribed.)    Scribe:  Karyn Felix 5/9/19 6:03 PM Scribing for and in the presence of Brenden Gupta CNP. Signed by: Adam Schwab, 05/09/19 7:36 PM    Provider:  I personally performed the services described in the documentation,reviewed and edited the documentation which was dictated to the scribe in my presence, and it accurately records my words and actions.     Brenden Gupta CNP 05/09/19 7:36 PM    POONAM Saravia CNP  05/09/19 2009

## 2019-05-10 ENCOUNTER — TELEPHONE (OUTPATIENT)
Dept: FAMILY MEDICINE CLINIC | Age: 49
End: 2019-05-10

## 2021-04-13 ENCOUNTER — APPOINTMENT (OUTPATIENT)
Dept: GENERAL RADIOLOGY | Age: 51
End: 2021-04-13
Payer: MEDICARE

## 2021-04-13 ENCOUNTER — HOSPITAL ENCOUNTER (EMERGENCY)
Age: 51
Discharge: HOME | End: 2021-04-13
Attending: EMERGENCY MEDICINE
Payer: MEDICARE

## 2021-04-13 VITALS
TEMPERATURE: 97.9 F | RESPIRATION RATE: 16 BRPM | DIASTOLIC BLOOD PRESSURE: 69 MMHG | WEIGHT: 115 LBS | HEART RATE: 109 BPM | SYSTOLIC BLOOD PRESSURE: 108 MMHG | BODY MASS INDEX: 18.05 KG/M2 | OXYGEN SATURATION: 98 % | HEIGHT: 67 IN

## 2021-04-13 DIAGNOSIS — J44.1 COPD WITH ACUTE EXACERBATION (HCC): Primary | ICD-10-CM

## 2021-04-13 LAB
ALBUMIN SERPL-MCNC: 4.4 G/DL (ref 3.5–5.1)
ALP BLD-CCNC: 184 U/L (ref 38–126)
ALT SERPL-CCNC: 16 U/L (ref 11–66)
ANION GAP SERPL CALCULATED.3IONS-SCNC: 12 MEQ/L (ref 8–16)
AST SERPL-CCNC: 20 U/L (ref 5–40)
BASOPHILS # BLD: 0.2 %
BASOPHILS ABSOLUTE: 0 THOU/MM3 (ref 0–0.1)
BILIRUB SERPL-MCNC: 0.6 MG/DL (ref 0.3–1.2)
BILIRUBIN DIRECT: < 0.2 MG/DL (ref 0–0.3)
BUN BLDV-MCNC: 8 MG/DL (ref 7–22)
CALCIUM SERPL-MCNC: 9.4 MG/DL (ref 8.5–10.5)
CHLORIDE BLD-SCNC: 97 MEQ/L (ref 98–111)
CO2: 24 MEQ/L (ref 23–33)
CREAT SERPL-MCNC: 0.9 MG/DL (ref 0.4–1.2)
EKG ATRIAL RATE: 108 BPM
EKG P AXIS: 78 DEGREES
EKG P-R INTERVAL: 130 MS
EKG Q-T INTERVAL: 314 MS
EKG QRS DURATION: 86 MS
EKG QTC CALCULATION (BAZETT): 420 MS
EKG R AXIS: 83 DEGREES
EKG T AXIS: 58 DEGREES
EKG VENTRICULAR RATE: 108 BPM
EOSINOPHIL # BLD: 0.9 %
EOSINOPHILS ABSOLUTE: 0.1 THOU/MM3 (ref 0–0.4)
ERYTHROCYTE [DISTWIDTH] IN BLOOD BY AUTOMATED COUNT: 13.2 % (ref 11.5–14.5)
ERYTHROCYTE [DISTWIDTH] IN BLOOD BY AUTOMATED COUNT: 52.5 FL (ref 35–45)
GFR SERPL CREATININE-BSD FRML MDRD: 89 ML/MIN/1.73M2
GLUCOSE BLD-MCNC: 137 MG/DL (ref 70–108)
HCT VFR BLD CALC: 51.2 % (ref 42–52)
HEMOGLOBIN: 17.6 GM/DL (ref 14–18)
IMMATURE GRANS (ABS): 0.03 THOU/MM3 (ref 0–0.07)
IMMATURE GRANULOCYTES: 0.3 %
LYMPHOCYTES # BLD: 29.8 %
LYMPHOCYTES ABSOLUTE: 2.6 THOU/MM3 (ref 1–4.8)
MCH RBC QN AUTO: 36.4 PG (ref 26–33)
MCHC RBC AUTO-ENTMCNC: 34.4 GM/DL (ref 32.2–35.5)
MCV RBC AUTO: 106 FL (ref 80–94)
MONOCYTES # BLD: 5.6 %
MONOCYTES ABSOLUTE: 0.5 THOU/MM3 (ref 0.4–1.3)
NUCLEATED RED BLOOD CELLS: 0 /100 WBC
OSMOLALITY CALCULATION: 266.8 MOSMOL/KG (ref 275–300)
PLATELET # BLD: 278 THOU/MM3 (ref 130–400)
PMV BLD AUTO: 9 FL (ref 9.4–12.4)
POTASSIUM SERPL-SCNC: 4.1 MEQ/L (ref 3.5–5.2)
PRO-BNP: 27.6 PG/ML (ref 0–900)
RBC # BLD: 4.83 MILL/MM3 (ref 4.7–6.1)
SARS-COV-2, NAAT: NOT DETECTED
SEG NEUTROPHILS: 63.2 %
SEGMENTED NEUTROPHILS ABSOLUTE COUNT: 5.5 THOU/MM3 (ref 1.8–7.7)
SODIUM BLD-SCNC: 133 MEQ/L (ref 135–145)
TOTAL PROTEIN: 7.7 G/DL (ref 6.1–8)
TROPONIN T: < 0.01 NG/ML
TROPONIN T: < 0.01 NG/ML
WBC # BLD: 8.7 THOU/MM3 (ref 4.8–10.8)

## 2021-04-13 PROCEDURE — 94640 AIRWAY INHALATION TREATMENT: CPT

## 2021-04-13 PROCEDURE — 85025 COMPLETE CBC W/AUTO DIFF WBC: CPT

## 2021-04-13 PROCEDURE — 6360000002 HC RX W HCPCS: Performed by: STUDENT IN AN ORGANIZED HEALTH CARE EDUCATION/TRAINING PROGRAM

## 2021-04-13 PROCEDURE — 2580000003 HC RX 258: Performed by: STUDENT IN AN ORGANIZED HEALTH CARE EDUCATION/TRAINING PROGRAM

## 2021-04-13 PROCEDURE — 80053 COMPREHEN METABOLIC PANEL: CPT

## 2021-04-13 PROCEDURE — 93010 ELECTROCARDIOGRAM REPORT: CPT | Performed by: NUCLEAR MEDICINE

## 2021-04-13 PROCEDURE — 93005 ELECTROCARDIOGRAM TRACING: CPT | Performed by: EMERGENCY MEDICINE

## 2021-04-13 PROCEDURE — 84484 ASSAY OF TROPONIN QUANT: CPT

## 2021-04-13 PROCEDURE — 6360000002 HC RX W HCPCS: Performed by: EMERGENCY MEDICINE

## 2021-04-13 PROCEDURE — 82248 BILIRUBIN DIRECT: CPT

## 2021-04-13 PROCEDURE — 6370000000 HC RX 637 (ALT 250 FOR IP): Performed by: EMERGENCY MEDICINE

## 2021-04-13 PROCEDURE — 71045 X-RAY EXAM CHEST 1 VIEW: CPT

## 2021-04-13 PROCEDURE — 96374 THER/PROPH/DIAG INJ IV PUSH: CPT

## 2021-04-13 PROCEDURE — 83880 ASSAY OF NATRIURETIC PEPTIDE: CPT

## 2021-04-13 PROCEDURE — 96375 TX/PRO/DX INJ NEW DRUG ADDON: CPT

## 2021-04-13 PROCEDURE — 36415 COLL VENOUS BLD VENIPUNCTURE: CPT

## 2021-04-13 PROCEDURE — 87635 SARS-COV-2 COVID-19 AMP PRB: CPT

## 2021-04-13 PROCEDURE — 99284 EMERGENCY DEPT VISIT MOD MDM: CPT

## 2021-04-13 RX ORDER — 0.9 % SODIUM CHLORIDE 0.9 %
1000 INTRAVENOUS SOLUTION INTRAVENOUS ONCE
Status: COMPLETED | OUTPATIENT
Start: 2021-04-13 | End: 2021-04-13

## 2021-04-13 RX ORDER — LORAZEPAM 2 MG/ML
0.5 INJECTION INTRAMUSCULAR ONCE
Status: COMPLETED | OUTPATIENT
Start: 2021-04-13 | End: 2021-04-13

## 2021-04-13 RX ORDER — IPRATROPIUM BROMIDE AND ALBUTEROL SULFATE 2.5; .5 MG/3ML; MG/3ML
3 SOLUTION RESPIRATORY (INHALATION) ONCE
Status: COMPLETED | OUTPATIENT
Start: 2021-04-13 | End: 2021-04-13

## 2021-04-13 RX ORDER — METHYLPREDNISOLONE SODIUM SUCCINATE 125 MG/2ML
125 INJECTION, POWDER, LYOPHILIZED, FOR SOLUTION INTRAMUSCULAR; INTRAVENOUS ONCE
Status: COMPLETED | OUTPATIENT
Start: 2021-04-13 | End: 2021-04-13

## 2021-04-13 RX ORDER — PREDNISONE 20 MG/1
40 TABLET ORAL DAILY
Qty: 10 TABLET | Refills: 0 | Status: SHIPPED | OUTPATIENT
Start: 2021-04-13 | End: 2021-04-18

## 2021-04-13 RX ADMIN — IPRATROPIUM BROMIDE AND ALBUTEROL SULFATE 3 AMPULE: .5; 3 SOLUTION RESPIRATORY (INHALATION) at 11:15

## 2021-04-13 RX ADMIN — SODIUM CHLORIDE 1000 ML: 9 INJECTION, SOLUTION INTRAVENOUS at 12:27

## 2021-04-13 RX ADMIN — METHYLPREDNISOLONE SODIUM SUCCINATE 125 MG: 125 INJECTION, POWDER, FOR SOLUTION INTRAMUSCULAR; INTRAVENOUS at 11:37

## 2021-04-13 RX ADMIN — LORAZEPAM 0.5 MG: 2 INJECTION INTRAMUSCULAR; INTRAVENOUS at 12:27

## 2021-04-13 ASSESSMENT — ENCOUNTER SYMPTOMS
ABDOMINAL DISTENTION: 0
ABDOMINAL PAIN: 0
CHOKING: 0
CONSTIPATION: 0
COUGH: 0
ANAL BLEEDING: 0
NAUSEA: 0
DIARRHEA: 1
SHORTNESS OF BREATH: 1
CHEST TIGHTNESS: 0
BACK PAIN: 0
VOMITING: 0

## 2021-04-13 ASSESSMENT — PAIN DESCRIPTION - FREQUENCY: FREQUENCY: CONTINUOUS

## 2021-04-13 NOTE — ED NOTES
Patient presents to ED for chest pain and SOB. States he has had SOB for the last two days with intermittent chest pain. Reports the chest pain most recently began this morning after waking up. Patient complains of a dry cough and states he had diarrhea this morning. Denies any loss of the sense of taste or smell. Rates pain 5/10. Shows no signs of distress. Skin warm and dry. Respirations even and unlabored.       Deidra Aguilar RN  04/13/21 3735

## 2021-04-13 NOTE — ED NOTES
Patient ambulated by Margarito Lisa LPN with pulse ox. Noted to be 97-98% on RA with a heart rate of 128. Dr. Marquise Barney and Dr. Misty Fish notified.       Bridget Kennedy RN  04/13/21 2601 [address] Paige  04/13/21 9852

## 2021-04-13 NOTE — ED NOTES
Patient resting quietly in cot showing no signs of distress at this time. Rates pain 5/10. Medicated per MAR. Respiratory at bedside.      Tyler Cole RN  04/13/21 7770

## 2021-04-13 NOTE — ED PROVIDER NOTES
Psychiatric/Behavioral: Negative for behavioral problems, confusion and self-injury. The patient is not nervous/anxious. PAST MEDICAL AND SURGICAL HISTORY     Past Medical History:   Diagnosis Date    Asthma     [facility] 1 [facility] ([facility] [address]     COPD (chronic obstructive pulmonary disease) ([facility] [address]     GERD (gastroesophageal reflux disease)     IBS (irritable bowel syndrome)     Liver disease     non alcoholic fatty liver    Lung collapse     Left     Past Surgical History:   Procedure Laterality Date    AORTO-SMA BYPASS GRAFT      CARDIAC SURGERY      heart cath-no stents    CHOLECYSTECTOMY      COLONOSCOPY      ENDOSCOPY, COLON, DIAGNOSTIC      TONSILLECTOMY  2010         MEDICATIONS   No current facility-administered medications for this encounter.      Current Outpatient Medications:     predniSONE (DELTASONE) 20 MG tablet, Take 2 tablets by mouth daily for 5 days, Disp: 10 tablet, Rfl: 0    ranitidine (ZANTAC) 150 MG tablet, Take 1 tablet by mouth 2 times daily, Disp: 60 tablet, Rfl: 0    sucralfate (CARAFATE) 1 GM tablet, Take 1 tablet by mouth 4 times daily, Disp: 120 tablet, Rfl: 0    albuterol sulfate HFA (PROVENTIL HFA) 108 (90 Base) MCG/ACT inhaler, Inhale 2 puffs into the lungs every 4 hours as needed for Wheezing or Shortness of Breath (Space out to every 6 hours as symptoms improve) Space out to every 6 hours as symptoms improve., Disp: 1 Inhaler, Rfl: 0    Spacer/Aero-Holding Chambers (E-Z SPACER) RICH, 1 Device by Does not apply route daily as needed (spacer), Disp: 1 Device, Rfl: 0    tiotropium (SPIRIVA HANDIHALER) 18 MCG inhalation capsule, Inhale 1 capsule into the lungs daily, Disp: 30 capsule, Rfl: 11    budesonide-formoterol (SYMBICORT) 160-4.5 MCG/ACT AERO, Inhale 2 puffs into the lungs 2 times daily Rinse mouth after its use., Disp: 1 Inhaler, Rfl: 11    varenicline (CHANTIX STARTING MONTH TA) 0.5 MG X 11 & 1 MG X 42 tablet, Take by mouth., Disp: 53 tablet, Rfl: 0   varenicline (CHANTIX CONTINUING MONTH TA) 1 MG tablet, Take 1 tablet by mouth 2 times daily, Disp: 60 tablet, Rfl: 11    ondansetron (ZOFRAN) 4 MG tablet, Take 1 tablet by mouth every 8 hours as needed for Nausea or Vomiting, Disp: 20 tablet, Rfl: 0    Acetaminophen 650 MG TABS, Take 650 mg by mouth every 4 hours as needed. , Disp: 120 tablet, Rfl:     metoclopramide (REGLAN) 5 MG tablet, Take 5 mg by mouth 3 times daily (before meals). , Disp: , Rfl:     amitriptyline (ELAVIL) 10 MG tablet, Take 10 mg by mouth nightly., Disp: , Rfl:     ARIPiprazole (ABILIFY) 10 MG tablet, Take 1 tablet by mouth nightly., Disp: 30 tablet, Rfl: 2    citalopram (CELEXA) 40 MG tablet, Take 40 mg by mouth daily. , Disp: , Rfl:       SOCIAL HISTORY     Social History     Social History Narrative    Not on file     Social History     Tobacco Use    Smoking status: Current Every Day Smoker     Packs/day: 0.50     Years: 36.00     Pack years: 18.00     Types: Cigarettes     Start date: 3/23/1975    Smokeless tobacco: Never Used   Substance Use Topics    Alcohol use: No     Alcohol/week: 0.0 standard drinks    Drug use: No         ALLERGIES   No Known Allergies      FAMILY HISTORY     Family History   Problem Relation Age of Onset    Liver Cancer Father     Cancer Father     Heart Disease Mother          PREVIOUS RECORDS   Previous records reviewed been seen in 2019 for nausea and vomiting. PHYSICAL EXAM     ED Triage Vitals [04/13/21 0939]   BP Temp Temp Source Pulse Resp SpO2 Height Weight   (!) 159/97 97.9 °F (36.6 °C) Oral 105 18 98 % 5' 6.5\" (1.689 m) 115 lb (52.2 kg)     Initial vital signs and nursing assessment reviewed and abnormal from tachycardia. Body mass index is 18.28 kg/m². Pulsoximetry is normal per my interpretation. Additional Vital Signs:  Vitals:    04/13/21 1425   BP:    Pulse: 109   Resp:    Temp:    SpO2:        Physical Exam  Constitutional:       General: He is not in acute distress. Appearance: He is well-developed and normal weight. He is not ill-appearing. HENT:      Head: Normocephalic and atraumatic. Neck:      Thyroid: No thyromegaly. Vascular: No hepatojugular reflux. Trachea: No tracheal deviation. Cardiovascular:      Rate and Rhythm: Regular rhythm. Tachycardia present. No extrasystoles are present. Heart sounds: No murmur. No gallop. Pulmonary:      Effort: Pulmonary effort is normal. No tachypnea, bradypnea, accessory muscle usage or respiratory distress. Breath sounds: Normal breath sounds. Chest:      Chest wall: No mass, deformity, tenderness, crepitus or edema. There is no dullness to percussion. Abdominal:      General: Bowel sounds are normal.      Palpations: Abdomen is soft. There is no mass. Tenderness: There is no abdominal tenderness. Musculoskeletal: Normal range of motion. Right lower leg: He exhibits no tenderness. No edema. Left lower leg: He exhibits no tenderness. No edema. Skin:     General: Skin is warm and dry. Capillary Refill: Capillary refill takes less than 2 seconds. Coloration: Skin is not cyanotic. Findings: No ecchymosis or rash. Neurological:      Mental Status: He is alert and oriented to person, place, and time. Psychiatric:         Mood and Affect: Mood is not anxious. Behavior: Behavior normal.             MEDICAL DECISION MAKING   Initial Assessment:   1. COPD exacerbation. Plan:    Will get basic labs and Chest xray. Will also obtain troponin to rule out heart disease.          ED RESULTS   Laboratory results:  Labs Reviewed   CBC WITH AUTO DIFFERENTIAL - Abnormal; Notable for the following components:       Result Value    .0 (*)     MCH 36.4 (*)     RDW-SD 52.5 (*)     MPV 9.0 (*)     All other components within normal limits   BASIC METABOLIC PANEL - Abnormal; Notable for the following components:    Sodium 133 (*)     Chloride 97 (*)     Glucose 137 (*) All other components within normal limits   HEPATIC FUNCTION PANEL - Abnormal; Notable for the following components:    Alkaline Phosphatase 184 (*)     All other components within normal limits   GLOMERULAR FILTRATION RATE, ESTIMATED - Abnormal; Notable for the following components:    Est, Glom Filt Rate 89 (*)     All other components within normal limits   OSMOLALITY - Abnormal; Notable for the following components:    Osmolality Calc 266.8 (*)     All other components within normal limits   COVID-19, RAPID   TROPONIN   ANION GAP   BRAIN NATRIURETIC PEPTIDE   TROPONIN       Radiologic studies results:  XR CHEST PORTABLE   Final Result   Lungs are mildly hyperinflated no acute cardiopulmonary process identified. **This report has been created using voice recognition software. It may contain minor errors which are inherent in voice recognition technology. **      Final report electronically signed by Dr. Feliberto Quijano on 4/13/2021 10:23 AM          ED Medications administered this visit:   Medications   ipratropium-albuterol (DUONEB) nebulizer solution 3 ampule (3 ampules Inhalation Given 4/13/21 1115)   methylPREDNISolone sodium (SOLU-MEDROL) injection 125 mg (125 mg Intravenous Given 4/13/21 1137)   LORazepam (ATIVAN) injection 0.5 mg (0.5 mg Intravenous Given 4/13/21 1227)   0.9 % sodium chloride bolus (0 mLs Intravenous Stopped 4/13/21 1425)         ED COURSE     ED Course as of Apr 13 1431   Tue Apr 13, 2021   1047 Patient has mild hyponatremia. Troponin negative. Chest xray show no acute process. CBC normal    [JESSICA]   1048 Incidental finding of elevated alk phos patient has no abdominal pain or fever, NV    [JESSICA]   1048 COVID not detected    [JESSICA]   1157 Patient reexamined after duonebs and steroids. Patient is tachycardic in 120s and states his substernal chest pain is worse. Tachycardia likely from duonebs.  Will give fluids to help with tachycardia and give ativan for anxiety    [JESSICA]   1158 Will

## 2021-04-14 ENCOUNTER — CARE COORDINATION (OUTPATIENT)
Dept: CARE COORDINATION | Age: 51
End: 2021-04-14

## 2021-04-14 NOTE — CARE COORDINATION
Patient contacted regarding recent discharge and COVID-19 risk. Discussed COVID-19 related testing which was available at this time. Test results were negative. Patient informed of results, if available? Patient aware of negative COVID-19 results. Care Transition Nurse/ Ambulatory Care Manager contacted the patient by telephone to perform post discharge assessment. Verified name and  with patient as identifiers. Patient has following risk factors of: COPD and asthma. CTN/ACM reviewed discharge instructions, medical action plan and red flags related to discharge diagnosis. Reviewed and educated them on any new and changed medications related to discharge diagnosis. Advised obtaining a 90-day supply of all daily and as-needed medications. Education provided regarding infection prevention, and signs and symptoms of COVID-19 and when to seek medical attention with patient who verbalized understanding. Discussed exposure protocols and quarantine from [address] you at higher risk for severe illness  and given an opportunity for questions and concerns. The patient agrees to contact the COVID-19 hotline [phone] or PCP office for questions related to their healthcare. CTN/ACM provided contact information for future reference. From CDC: Are you at higher risk for severe illness?  Wash your hands often.  Avoid close contact (6 feet, which is about two arm lengths) with people who are sick.  Put distance between yourself and other people if COVID-19 is spreading in your community.  Clean and disinfect frequently touched surfaces.  Avoid all cruise travel and non-essential air travel.    Call your healthcare professional if you have concerns about COVID-19 and your underlying condition or if you are sick    Patient/family/caregiver given information for GetWell Loop and agrees to enroll  - LOOP enrollment completed for Self Monitoring POC   Patient's preferred e-mail: [email or web address]    Patient's preferred phone number: [phone]               Based on Loop alert triggers, patient will be contacted by nurse care manager for worsening symptoms. For more information on steps you can take to protect yourself, see CDC's How to Protect Yourself    Pt will be further monitored by COVID Loop Team based on severity of symptoms and risk factors. Patient called for covid-19 f/u s/p recent ED visit for c/o chest pain, SOB, and diarrhea. Patient denied any new/change/worsening of symptoms. Patient reported he is feeling better today and has started medications as ordered and has scheduled f/u with PCP. Patient denied any questions re: his discharge instructions. Covid-19 education was reviewed and patient verbalized understanding. Patient was reminded to call covid-19 hotline number with any new symptoms or questions/concerns. Patient verbalized understanding and stated he is aware of hotline information. Healthcare Decision Maker information reviewed and verified with patient. ACP note completed. Patient denied any other questions, concerns, or needs.

## 2022-07-25 ENCOUNTER — TELEPHONE (OUTPATIENT)
Dept: FAMILY MEDICINE CLINIC | Age: 52
End: 2022-07-25

## 2022-07-25 NOTE — TELEPHONE ENCOUNTER
The patient called in requesting to scheduled an appt. He has not been seen since 5/2018. Please advise.

## 2022-09-06 ENCOUNTER — APPOINTMENT (OUTPATIENT)
Dept: GENERAL RADIOLOGY | Age: 52
End: 2022-09-06
Payer: MEDICARE

## 2022-09-06 ENCOUNTER — HOSPITAL ENCOUNTER (EMERGENCY)
Age: 52
Discharge: HOME | End: 2022-09-06
Attending: EMERGENCY MEDICINE
Payer: MEDICARE

## 2022-09-06 VITALS
BODY MASS INDEX: 17.19 KG/M2 | HEART RATE: 86 BPM | TEMPERATURE: 97.5 F | WEIGHT: 107 LBS | SYSTOLIC BLOOD PRESSURE: 119 MMHG | OXYGEN SATURATION: 99 % | RESPIRATION RATE: 17 BRPM | HEIGHT: 66 IN | DIASTOLIC BLOOD PRESSURE: 79 MMHG

## 2022-09-06 DIAGNOSIS — R11.0 NAUSEA: Primary | ICD-10-CM

## 2022-09-06 DIAGNOSIS — R07.9 CHEST PAIN, UNSPECIFIED TYPE: ICD-10-CM

## 2022-09-06 LAB
ALBUMIN SERPL-MCNC: 4.5 G/DL (ref 3.5–5.1)
ALP BLD-CCNC: 161 U/L (ref 38–126)
ALT SERPL-CCNC: 12 U/L (ref 11–66)
ANION GAP SERPL CALCULATED.3IONS-SCNC: 13 MEQ/L (ref 8–16)
AST SERPL-CCNC: 17 U/L (ref 5–40)
BASOPHILS # BLD: 0.2 %
BASOPHILS ABSOLUTE: 0 THOU/MM3 (ref 0–0.1)
BILIRUB SERPL-MCNC: 0.5 MG/DL (ref 0.3–1.2)
BILIRUBIN DIRECT: < 0.2 MG/DL (ref 0–0.3)
BUN BLDV-MCNC: 9 MG/DL (ref 7–22)
CALCIUM SERPL-MCNC: 9.5 MG/DL (ref 8.5–10.5)
CHLORIDE BLD-SCNC: 99 MEQ/L (ref 98–111)
CO2: 24 MEQ/L (ref 23–33)
CREAT SERPL-MCNC: 0.9 MG/DL (ref 0.4–1.2)
EKG ATRIAL RATE: 88 BPM
EKG P AXIS: 81 DEGREES
EKG P-R INTERVAL: 112 MS
EKG Q-T INTERVAL: 344 MS
EKG QRS DURATION: 82 MS
EKG QTC CALCULATION (BAZETT): 416 MS
EKG R AXIS: 86 DEGREES
EKG T AXIS: 80 DEGREES
EKG VENTRICULAR RATE: 88 BPM
EOSINOPHIL # BLD: 0.2 %
EOSINOPHILS ABSOLUTE: 0 THOU/MM3 (ref 0–0.4)
ERYTHROCYTE [DISTWIDTH] IN BLOOD BY AUTOMATED COUNT: 12.4 % (ref 11.5–14.5)
ERYTHROCYTE [DISTWIDTH] IN BLOOD BY AUTOMATED COUNT: 46.6 FL (ref 35–45)
FLU A ANTIGEN: NEGATIVE
FLU B ANTIGEN: NEGATIVE
GFR SERPL CREATININE-BSD FRML MDRD: 88 ML/MIN/1.73M2
GLUCOSE BLD-MCNC: 99 MG/DL (ref 70–108)
HCT VFR BLD CALC: 46.5 % (ref 42–52)
HEMOGLOBIN: 16.5 GM/DL (ref 14–18)
IMMATURE GRANS (ABS): 0.04 THOU/MM3 (ref 0–0.07)
IMMATURE GRANULOCYTES: 0.4 %
LYMPHOCYTES # BLD: 23.6 %
LYMPHOCYTES ABSOLUTE: 2.2 THOU/MM3 (ref 1–4.8)
MCH RBC QN AUTO: 36.4 PG (ref 26–33)
MCHC RBC AUTO-ENTMCNC: 35.5 GM/DL (ref 32.2–35.5)
MCV RBC AUTO: 102.6 FL (ref 80–94)
MONOCYTES # BLD: 6.4 %
MONOCYTES ABSOLUTE: 0.6 THOU/MM3 (ref 0.4–1.3)
NUCLEATED RED BLOOD CELLS: 0 /100 WBC
OSMOLALITY CALCULATION: 270.7 MOSMOL/KG (ref 275–300)
PLATELET # BLD: 233 THOU/MM3 (ref 130–400)
PMV BLD AUTO: 9.4 FL (ref 9.4–12.4)
POTASSIUM REFLEX MAGNESIUM: 3.6 MEQ/L (ref 3.5–5.2)
RBC # BLD: 4.53 MILL/MM3 (ref 4.7–6.1)
SARS-COV-2, NAAT: NOT  DETECTED
SEG NEUTROPHILS: 69.2 %
SEGMENTED NEUTROPHILS ABSOLUTE COUNT: 6.4 THOU/MM3 (ref 1.8–7.7)
SODIUM BLD-SCNC: 136 MEQ/L (ref 135–145)
TOTAL PROTEIN: 7.6 G/DL (ref 6.1–8)
TROPONIN T: < 0.01 NG/ML
WBC # BLD: 9.2 THOU/MM3 (ref 4.8–10.8)

## 2022-09-06 PROCEDURE — 6370000000 HC RX 637 (ALT 250 FOR IP): Performed by: STUDENT IN AN ORGANIZED HEALTH CARE EDUCATION/TRAINING PROGRAM

## 2022-09-06 PROCEDURE — 71045 X-RAY EXAM CHEST 1 VIEW: CPT

## 2022-09-06 PROCEDURE — 85025 COMPLETE CBC W/AUTO DIFF WBC: CPT

## 2022-09-06 PROCEDURE — 80076 HEPATIC FUNCTION PANEL: CPT

## 2022-09-06 PROCEDURE — 87804 INFLUENZA ASSAY W/OPTIC: CPT

## 2022-09-06 PROCEDURE — 93010 ELECTROCARDIOGRAM REPORT: CPT | Performed by: INTERNAL MEDICINE

## 2022-09-06 PROCEDURE — 93005 ELECTROCARDIOGRAM TRACING: CPT | Performed by: EMERGENCY MEDICINE

## 2022-09-06 PROCEDURE — 80048 BASIC METABOLIC PNL TOTAL CA: CPT

## 2022-09-06 PROCEDURE — 84484 ASSAY OF TROPONIN QUANT: CPT

## 2022-09-06 PROCEDURE — 99285 EMERGENCY DEPT VISIT HI MDM: CPT

## 2022-09-06 PROCEDURE — 87635 SARS-COV-2 COVID-19 AMP PRB: CPT

## 2022-09-06 RX ORDER — ONDANSETRON 4 MG/1
4 TABLET, ORALLY DISINTEGRATING ORAL 3 TIMES DAILY PRN
Qty: 21 TABLET | Refills: 0 | Status: SHIPPED | OUTPATIENT
Start: 2022-09-06 | End: 2022-10-11

## 2022-09-06 RX ORDER — ONDANSETRON 4 MG/1
4 TABLET, ORALLY DISINTEGRATING ORAL ONCE
Status: COMPLETED | OUTPATIENT
Start: 2022-09-06 | End: 2022-09-06

## 2022-09-06 RX ORDER — CALCIUM CARBONATE 200(500)MG
1 TABLET,CHEWABLE ORAL DAILY
Qty: 30 TABLET | Refills: 0 | Status: SHIPPED | OUTPATIENT
Start: 2022-09-06 | End: 2022-09-12 | Stop reason: ALTCHOICE

## 2022-09-06 RX ORDER — ACETAMINOPHEN 325 MG/1
650 TABLET ORAL ONCE
Status: COMPLETED | OUTPATIENT
Start: 2022-09-06 | End: 2022-09-06

## 2022-09-06 RX ADMIN — ONDANSETRON 4 MG: 4 TABLET, ORALLY DISINTEGRATING ORAL at 11:08

## 2022-09-06 RX ADMIN — LIDOCAINE HYDROCHLORIDE: 20 SOLUTION ORAL; TOPICAL at 11:09

## 2022-09-06 RX ADMIN — ACETAMINOPHEN 650 MG: 325 TABLET ORAL at 11:08

## 2022-09-06 ASSESSMENT — ENCOUNTER SYMPTOMS
RHINORRHEA: 1
SORE THROAT: 0
VOMITING: 1
ABDOMINAL PAIN: 0
BACK PAIN: 0
CHEST TIGHTNESS: 1
SHORTNESS OF BREATH: 0
SHORTNESS OF BREATH: 1
DIARRHEA: 0
NAUSEA: 1
COLOR CHANGE: 0
DIARRHEA: 1
COUGH: 1
VOMITING: 0
SINUS PRESSURE: 0
CONSTIPATION: 0
WHEEZING: 1
BLOOD IN STOOL: 0
SORE THROAT: 1
SINUS PAIN: 0

## 2022-09-06 ASSESSMENT — PAIN - FUNCTIONAL ASSESSMENT: PAIN_FUNCTIONAL_ASSESSMENT: NONE - DENIES PAIN

## 2022-09-06 NOTE — ED NOTES
Presents to ER with complaints of chest pain that began yesterday and has been intermittent. States this morning he also noted nausea with no emesis. Pt denies chest pain upon arrival. EKG completed. Will continue to monitor.      Nivia Heredia RN  09/06/22 1002

## 2022-09-06 NOTE — DISCHARGE INSTRUCTIONS
Please  and take the prescribed medications that may help with your discomfort. I have given you a cardiology appointment for Thursday, 9/8/2022. Please attend or call to move the appointment if you are unable based on the time. Keep your appointment with your primary doctor and gastroenterologist.  As we discussed, return to the emergency department immediately if you begin having persistent chest pain, difficulty tolerating oral intake or other emergent concerns.

## 2022-09-06 NOTE — ED NOTES
**This is a Medical/PA/APRN Student Note and is charted for educational purposes. The non-physician staff attested note is not to be used for billing purposes, chart documentation or to guide patient care. Please see the supervising physician/PA/APRN modifications/attestation for treatment plan/chart documentation/suggestions. This note has been reviewed and feedback has been provided to the student. **      MEDICAL STUDENT NOTE    Chief Complaint   Patient presents with    Chest Pain    Nausea     History obtained from the patient. NINI Snow is a 47 y.o. male, c/o chest pain, nausea, vomiting    Patient reports having chest pain with coughing that started two days ago, this morning developed some nausea and vomiting with diarrhea. Chest pain is described as either a dull or sharp pain, and it resolves with coughing. Patient does have history of COPD. Has not had this pain prior. Patient reports having a fever of 101 yesterday. Has not tried anything OTC. Patient reports having a sore throat and some rhinorrhea. No hematemesis, just undigested food. Pain is not post-prandial.    Patient has had COPD for 20 years. Patient is not taking any inhalers at home, has not had access to physicians. Has been taking Nexium. Patient has history of GERD and SMA syndrome. Patient also reports having trouble sleeping. Needing to move around family doctors. Review of Systems   Constitutional:  Positive for appetite change (decreased) and fever. Negative for chills and unexpected weight change. HENT:  Positive for congestion, rhinorrhea and sore throat. Respiratory:  Positive for cough, chest tightness, shortness of breath and wheezing. Cardiovascular:  Positive for chest pain. Negative for palpitations. Gastrointestinal:  Positive for diarrhea, nausea and vomiting. Negative for abdominal pain, blood in stool and constipation. Genitourinary:  Negative for dysuria and hematuria.    Skin:  Negative for rash.   Neurological:  Positive for headaches. Negative for syncope, weakness, light-headedness and numbness. Psychiatric/Behavioral:  Negative for self-injury and suicidal ideas. Past Medical History:   Diagnosis Date    Asthma     Bipolar 1 disorder ([address]     COPD (chronic obstructive pulmonary disease) (HCC)     GERD (gastroesophageal reflux disease)     IBS (irritable bowel syndrome)     Liver disease     non alcoholic fatty liver    Lung collapse     Left         Past Surgical History:   Procedure Laterality Date    AORTO-SMA BYPASS GRAFT      CARDIAC SURGERY      heart cath-no stents    CHOLECYSTECTOMY      COLONOSCOPY      ENDOSCOPY, COLON, DIAGNOSTIC      TONSILLECTOMY  2010       No current facility-administered medications for this encounter.     Current Outpatient Medications:     albuterol sulfate HFA (PROVENTIL HFA) 108 (90 Base) MCG/ACT inhaler, Inhale 2 puffs into the lungs every 4 hours as needed for Wheezing or Shortness of Breath (Space out to every 6 hours as symptoms improve) Space out to every 6 hours as symptoms improve., Disp: 1 Inhaler, Rfl: 0    Spacer/Aero-Holding Chambers (E-Z SPACER) RICH, 1 Device by Does not apply route daily as needed (spacer), Disp: 1 Device, Rfl: 0    tiotropium (SPIRIVA HANDIHALER) 18 MCG inhalation capsule, Inhale 1 capsule into the lungs daily, Disp: 30 capsule, Rfl: 11    budesonide-formoterol (SYMBICORT) 160-4.5 MCG/ACT AERO, Inhale 2 puffs into the lungs 2 times daily Rinse mouth after its use., Disp: 1 Inhaler, Rfl: 11    varenicline (CHANTIX STARTING MONTH TA) 0.5 MG X 11 & 1 MG X 42 tablet, Take by mouth., Disp: 53 tablet, Rfl: 0    varenicline (CHANTIX CONTINUING MONTH PAK) 1 MG tablet, Take 1 tablet by mouth 2 times daily, Disp: 60 tablet, Rfl: 11    ondansetron (ZOFRAN) 4 MG tablet, Take 1 tablet by mouth every 8 hours as needed for Nausea or Vomiting, Disp: 20 tablet, Rfl: 0    Acetaminophen 650 MG TABS, Take 650 mg by mouth every 4 hours as needed. , Disp: 120 tablet, Rfl:     citalopram (CELEXA) 40 MG tablet, Take 40 mg by mouth daily. , Disp: , Rfl:     Social History     Social History Narrative    Not on file     Social History     Tobacco Use    Smoking status: Every Day     Packs/day: 0.50     Years: 36.00     Pack years: 18.00     Types: Cigarettes     Start date: 3/23/1975    Smokeless tobacco: Never   Vaping Use    Vaping Use: Never used   Substance Use Topics    Alcohol use: No     Alcohol/week: 0.0 standard drinks    Drug use: No       No Known Allergies    Family History   Problem Relation Age of Onset    Liver Cancer Father     Cancer Father     Heart Disease Mother            Previous records reviewed: {Blank multiple:84668::\"This is this patient's first visit to [facility] ED, no previous records available on EMR. \",\"***\"}. Vitals:    09/06/22 0957   BP: (!) 137/92   Pulse: 92   Resp: 15   Temp: 97.5 °F (36.4 °C)   SpO2: 99%     Vital signs and nursing assessment reviewed and {Blank multiple:68407::\"normal\",\"abnormal from ***\"}. Pulsoximetry is {Blank multiple:22112::\"abnormal\",\"normal\"} per my interpretation. Physical Exam        MDM  Initial Assessment: ***. Plan: ***. Case and management plan discussed with Dr. Dior Barney Labs Reviewed   BASIC METABOLIC PANEL W/ REFLEX TO MG FOR LOW K   TROPONIN   CBC WITH AUTO DIFFERENTIAL   HEPATIC FUNCTION PANEL       Medications - No data to display    XR CHEST PORTABLE   Final Result   Tiny focal atelectasis or developing infiltrate left upper lobe. **This report has been created using voice recognition software. It may contain minor errors which are inherent in voice recognition technology. **      Final report electronically signed by Dr. Bennie Strong on 9/6/2022 10:18 AM          Final diagnoses:   None       New Prescriptions    No medications on file           Condition: {condition:89223:::1}    Disposition: {Plan; disposition:20094}    Electronically signed by Sushma Acharya Lorna Goncalves on 9/6/2022 at 10:29 AM       **This is a Medical/PA/APRN Student Note and is charted for educational purposes. The non-physician staff attested note is not to be used for billing purposes, chart documentation or to guide patient care. Please see the supervising physician/PA/APRN modifications/attestation for treatment plan/chart documentation/suggestions. This note has been reviewed and feedback has been provided to the student.  **

## 2022-09-06 NOTE — ED NOTES
Patient resting in bed. Respirations easy and unlabored. No distress noted. Call light within reach.      Nivia Heredia RN  09/06/22 0217

## 2022-09-06 NOTE — ED NOTES
Patient resting in bed. Respirations easy and unlabored. No distress noted. Call light within reach.      Nivia Heredia RN  09/06/22 0993

## 2022-09-06 NOTE — ED PROVIDER NOTES
Negative for difficulty urinating and dysuria. Musculoskeletal:  Negative for arthralgias, back pain, neck pain and neck stiffness. Skin:  Negative for color change and rash. Neurological:  Negative for dizziness, weakness, light-headedness, numbness and headaches. PAST MEDICAL AND SURGICAL HISTORY     Past Medical History:   Diagnosis Date    Asthma     Bipolar 1 disorder ([facility] [address]     COPD (chronic obstructive pulmonary disease) (HCC)     GERD (gastroesophageal reflux disease)     IBS (irritable bowel syndrome)     Liver disease     non alcoholic fatty liver    Lung collapse     Left     Past Surgical History:   Procedure Laterality Date    AORTO-SMA BYPASS GRAFT      CARDIAC SURGERY      heart cath-no stents    CHOLECYSTECTOMY      COLONOSCOPY      ENDOSCOPY, COLON, DIAGNOSTIC      TONSILLECTOMY  2010         MEDICATIONS   No current facility-administered medications for this encounter.     Current Outpatient Medications:     ondansetron (ZOFRAN-ODT) 4 MG disintegrating tablet, Take 1 tablet by mouth 3 times daily as needed for Nausea or Vomiting, Disp: 21 tablet, Rfl: 0    calcium carbonate (ANTACID) 500 MG chewable tablet, Take 1 tablet by mouth daily, Disp: 30 tablet, Rfl: 0    albuterol sulfate HFA (PROVENTIL HFA) 108 (90 Base) MCG/ACT inhaler, Inhale 2 puffs into the lungs every 4 hours as needed for Wheezing or Shortness of Breath (Space out to every 6 hours as symptoms improve) Space out to every 6 hours as symptoms improve., Disp: 1 Inhaler, Rfl: 0    Spacer/Aero-Holding Chambers (E-Z SPACER) RICH, 1 Device by Does not apply route daily as needed (spacer), Disp: 1 Device, Rfl: 0    tiotropium (SPIRIVA HANDIHALER) 18 MCG inhalation capsule, Inhale 1 capsule into the lungs daily, Disp: 30 capsule, Rfl: 11    budesonide-formoterol (SYMBICORT) 160-4.5 MCG/ACT AERO, Inhale 2 puffs into the lungs 2 times daily Rinse mouth after its use., Disp: 1 Inhaler, Rfl: 11    varenicline (CHANTIX STARTING MONTH TA) 0.5 MG X 11 & 1 MG X 42 tablet, Take by mouth., Disp: 53 tablet, Rfl: 0    varenicline (CHANTIX CONTINUING MONTH TA) 1 MG tablet, Take 1 tablet by mouth 2 times daily, Disp: 60 tablet, Rfl: 11    Acetaminophen 650 MG TABS, Take 650 mg by mouth every 4 hours as needed. , Disp: 120 tablet, Rfl:     citalopram (CELEXA) 40 MG tablet, Take 40 mg by mouth daily. , Disp: , Rfl:       SOCIAL HISTORY     Social History     Social History Narrative    Not on file     Social History     Tobacco Use    Smoking status: Every Day     Packs/day: 0.50     Years: 36.00     Pack years: 18.00     Types: Cigarettes     Start date: 3/23/1975    Smokeless tobacco: Never   Vaping Use    Vaping Use: Never used   Substance Use Topics    Alcohol use: No     Alcohol/week: 0.0 standard drinks    Drug use: No         ALLERGIES   No Known Allergies      FAMILY HISTORY     Family History   Problem Relation Age of Onset    Liver Cancer Father     Cancer Father     Heart Disease Mother          PREVIOUS RECORDS   Previous records reviewed:  Patient last seen in 2021 for COPD exacerbation . PHYSICAL EXAM     ED Triage Vitals [09/06/22 0957]   BP Temp Temp Source Heart Rate Resp SpO2 Height Weight   (!) 137/92 97.5 °F (36.4 °C) Oral 92 15 99 % 5' 6\" (1.676 m) 107 lb (48.5 kg)     Initial vital signs and nursing assessment reviewed and normal. Body mass index is 17.27 kg/m². Pulsoximetry is normal per my interpretation. Additional Vital Signs:  Vitals:    09/06/22 1222   BP: 119/79   Pulse: 86   Resp: 17   Temp:    SpO2: 99%       Physical Exam  Constitutional:       General: He is not in acute distress. Appearance: Normal appearance. He is not ill-appearing or diaphoretic. HENT:      Head: Normocephalic and atraumatic. Right Ear: Tympanic membrane and ear canal normal.      Left Ear: Tympanic membrane and ear canal normal.      Nose: Congestion and rhinorrhea present.       Mouth/Throat:      Mouth: Mucous membranes are dissection, pulmonary embolism. Plan:   Labs  Viral swabs  Fluids  Antiemetic  Antipyretic/analgesic  GI cocktail for symptomatic management given patient's longstanding GERD history  EKG  Chest x-ray  Expected disposition discharge home with outpatient cardiology follow-up          ED RESULTS   Laboratory results:  Labs Reviewed   CBC WITH AUTO DIFFERENTIAL - Abnormal; Notable for the following components:       Result Value    RBC 4.53 (*)     .6 (*)     MCH 36.4 (*)     RDW-SD 46.6 (*)     All other components within normal limits   HEPATIC FUNCTION PANEL - Abnormal; Notable for the following components:    Alkaline Phosphatase 161 (*)     All other components within normal limits   GLOMERULAR FILTRATION RATE, ESTIMATED - Abnormal; Notable for the following components:    Est, Glom Filt Rate 88 (*)     All other components within normal limits   OSMOLALITY - Abnormal; Notable for the following components:    Osmolality Calc 270.7 (*)     All other components within normal limits   COVID-19, RAPID   RAPID INFLUENZA A/B ANTIGENS   BASIC METABOLIC PANEL W/ REFLEX TO MG FOR LOW K   TROPONIN   ANION GAP       Radiologic studies results:  XR CHEST PORTABLE   Final Result   Tiny focal atelectasis or developing infiltrate left upper lobe. **This report has been created using voice recognition software. It may contain minor errors which are inherent in voice recognition technology. **      Final report electronically signed by Dr. Fidel New on 9/6/2022 10:18 AM          ED Medications administered this visit:   Medications   aluminum & magnesium hydroxide-simethicone (MAALOX) 30 mL, lidocaine viscous hcl (XYLOCAINE) 5 mL (GI COCKTAIL) ( Oral Given 9/6/22 1109)   ondansetron (ZOFRAN-ODT) disintegrating tablet 4 mg (4 mg Oral Given 9/6/22 1108)   acetaminophen (TYLENOL) tablet 650 mg (650 mg Oral Given 9/6/22 1108)         ED COURSE     ED Course as of 09/06/22 1527   Tue Sep 06, 2022   1234 On reevaluation patient is very comfortable resting on the cot states his pain is much improved. He is agreeable to follow-up as an outpatient with our cardiology clinic for his chest pain. [EM]   0789 Heart score 3, low risk plan for outpatient follow-up. [EM]   2104 Patient scheduled 1-Click appoint with cardiology on Thursday, 9/8/2022. [EM]   3582 Based on patient's symptoms I feel that this is atelectasis in the left upper lobe, do not suspect any pneumonia based on white count and vital signs at this time. [EM]   1526 Patient discharged with symptomatic medications Maalox and Zofran. [EM]      ED Course User Index  [EM] Law Zhang DO         Strict return precautions and follow up instructions were discussed with the patient prior to discharge, with which the patient agrees. MEDICATION CHANGES     Discharge Medication List as of 9/6/2022 12:38 PM        START taking these medications    Details   ondansetron (ZOFRAN-ODT) 4 MG disintegrating tablet Take 1 tablet by mouth 3 times daily as needed for Nausea or Vomiting, Disp-21 tablet, R-0Normal      calcium carbonate (ANTACID) 500 MG chewable tablet Take 1 tablet by mouth daily, Disp-30 tablet, R-0Normal               FINAL DISPOSITION     Final diagnoses:   Nausea   Chest pain, unspecified type     Condition: condition: stable  Dispo: Discharge to home      This transcription was electronically signed. Parts of this transcriptions may have been dictated by use of voice recognition software and electronically transcribed, and parts may have been transcribed with the assistance of an ED scribe. The transcription may contain errors not detected in proofreading. Please refer to my supervising physician's documentation if my documentation differs.     Electronically Signed: Law Zhang DO, 09/06/22, 3:27 PM        Law Zhang DO  Resident  09/06/22 8289

## 2022-09-12 ENCOUNTER — OFFICE VISIT (OUTPATIENT)
Dept: INTERNAL MEDICINE CLINIC | Age: 52
End: 2022-09-12
Payer: MEDICARE

## 2022-09-12 VITALS
TEMPERATURE: 98.3 F | HEART RATE: 84 BPM | DIASTOLIC BLOOD PRESSURE: 74 MMHG | WEIGHT: 104.2 LBS | OXYGEN SATURATION: 99 % | BODY MASS INDEX: 16.74 KG/M2 | HEIGHT: 66 IN | SYSTOLIC BLOOD PRESSURE: 108 MMHG

## 2022-09-12 DIAGNOSIS — Z72.0 TOBACCO USE: ICD-10-CM

## 2022-09-12 DIAGNOSIS — F31.9 BIPOLAR AFFECTIVE DISORDER, REMISSION STATUS UNSPECIFIED (HCC): ICD-10-CM

## 2022-09-12 DIAGNOSIS — J41.0 SIMPLE CHRONIC BRONCHITIS (HCC): ICD-10-CM

## 2022-09-12 DIAGNOSIS — J44.0 COPD (CHRONIC OBSTRUCTIVE PULMONARY DISEASE) WITH ACUTE BRONCHITIS (HCC): Primary | ICD-10-CM

## 2022-09-12 DIAGNOSIS — J20.9 COPD (CHRONIC OBSTRUCTIVE PULMONARY DISEASE) WITH ACUTE BRONCHITIS (HCC): Primary | ICD-10-CM

## 2022-09-12 DIAGNOSIS — F32.A DEPRESSIVE EPISODE: ICD-10-CM

## 2022-09-12 PROCEDURE — 99204 OFFICE O/P NEW MOD 45 MIN: CPT | Performed by: STUDENT IN AN ORGANIZED HEALTH CARE EDUCATION/TRAINING PROGRAM

## 2022-09-12 RX ORDER — BUDESONIDE AND FORMOTEROL FUMARATE DIHYDRATE 160; 4.5 UG/1; UG/1
2 AEROSOL RESPIRATORY (INHALATION) 2 TIMES DAILY
Qty: 30.6 G | Refills: 1 | Status: SHIPPED | OUTPATIENT
Start: 2022-09-12

## 2022-09-12 RX ORDER — ALBUTEROL SULFATE 90 UG/1
2 AEROSOL, METERED RESPIRATORY (INHALATION) 4 TIMES DAILY PRN
Qty: 18 G | Refills: 0 | Status: SHIPPED | OUTPATIENT
Start: 2022-09-12

## 2022-09-12 RX ORDER — ESOMEPRAZOLE MAGNESIUM 40 MG/1
40 CAPSULE, DELAYED RELEASE ORAL
COMMUNITY
End: 2022-09-13 | Stop reason: SDUPTHER

## 2022-09-12 SDOH — ECONOMIC STABILITY: FOOD INSECURITY: WITHIN THE PAST 12 MONTHS, YOU WORRIED THAT YOUR FOOD WOULD RUN OUT BEFORE YOU GOT MONEY TO BUY MORE.: NEVER TRUE

## 2022-09-12 SDOH — ECONOMIC STABILITY: FOOD INSECURITY: WITHIN THE PAST 12 MONTHS, THE FOOD YOU BOUGHT JUST DIDN'T LAST AND YOU DIDN'T HAVE MONEY TO GET MORE.: NEVER TRUE

## 2022-09-12 ASSESSMENT — SOCIAL DETERMINANTS OF HEALTH (SDOH): HOW HARD IS IT FOR YOU TO PAY FOR THE VERY BASICS LIKE FOOD, HOUSING, MEDICAL CARE, AND HEATING?: NOT HARD AT ALL

## 2022-09-12 ASSESSMENT — PATIENT HEALTH QUESTIONNAIRE - PHQ9
SUM OF ALL RESPONSES TO PHQ QUESTIONS 1-9: 20
3. TROUBLE FALLING OR STAYING ASLEEP: 3
9. THOUGHTS THAT YOU WOULD BE BETTER OFF DEAD, OR OF HURTING YOURSELF: 1
SUM OF ALL RESPONSES TO PHQ QUESTIONS 1-9: 19
SUM OF ALL RESPONSES TO PHQ QUESTIONS 1-9: 20
4. FEELING TIRED OR HAVING LITTLE ENERGY: 2
10. IF YOU CHECKED OFF ANY PROBLEMS, HOW DIFFICULT HAVE THESE PROBLEMS MADE IT FOR YOU TO DO YOUR WORK, TAKE CARE OF THINGS AT HOME, OR GET ALONG WITH OTHER PEOPLE: 1
7. TROUBLE CONCENTRATING ON THINGS, SUCH AS READING THE NEWSPAPER OR WATCHING TELEVISION: 1
5. POOR APPETITE OR OVEREATING: 3
2. FEELING DOWN, DEPRESSED OR HOPELESS: 3
1. LITTLE INTEREST OR PLEASURE IN DOING THINGS: 3
SUM OF ALL RESPONSES TO PHQ QUESTIONS 1-9: 20
8. MOVING OR SPEAKING SO SLOWLY THAT OTHER PEOPLE COULD HAVE NOTICED. OR THE OPPOSITE, BEING SO FIGETY OR RESTLESS THAT YOU HAVE BEEN MOVING AROUND A LOT MORE THAN USUAL: 1
SUM OF ALL RESPONSES TO PHQ9 QUESTIONS 1 & 2: 6
6. FEELING BAD ABOUT YOURSELF - OR THAT YOU ARE A FAILURE OR HAVE LET YOURSELF OR YOUR FAMILY DOWN: 3

## 2022-09-12 ASSESSMENT — COLUMBIA-SUICIDE SEVERITY RATING SCALE - C-SSRS
1. WITHIN THE PAST MONTH, HAVE YOU WISHED YOU WERE DEAD OR WISHED YOU COULD GO TO SLEEP AND NOT WAKE UP?: YES
2. HAVE YOU ACTUALLY HAD ANY THOUGHTS OF KILLING YOURSELF?: NO
6. HAVE YOU EVER DONE ANYTHING, STARTED TO DO ANYTHING, OR PREPARED TO DO ANYTHING TO END YOUR LIFE?: NO

## 2022-09-12 NOTE — PROGRESS NOTES
[address] Internal Medicine  [place] [address] [address], [address]  Dept: [phone]  Dept Fax: [phone] [facility] 1970 is a 47 y.o. male, New patient, here for evaluations of:  Chief Complaint   Patient presents with    New Patient     Establish care /  ER 9/6 chest pain , nausea          ASSESSMENT/PLAN:    1. COPD (chronic obstructive pulmonary disease) with acute bronchitis (HCC)   - Listed history, last PFT in 201, FEV1/% of predicted, FEV1 and FVC about 60% of predicted  -  May be a mixed picture between COPD and Restrictive condition  - Restarting Symbicort and As needed Albuterol inhaler today  - Ordering Bedtime Pulse Ox test to determine if he will need Oxygen at night (reports recent Pulse Ox 81% during EGD)  - Walk test today negative (96% and 99% Pulse ox respectively)  - Consider adding a new inhaler at next visit if symptoms not well improved/controlled  - Once better controlled, repeat PFTs  - Patient will need Low dose CT scan of lungs for Cancer screening on next visit       2. Bipolar affective disorder, remission status unspecified (HCC)/Depressive episode   - History of, no longer on medications  - States depression symptoms due to ongoing stressors in life, requested referral to psychology  - If unable to get a visit in 1 month, will initiate medications here  - Denies suicidal thoughts or ideations at this time  - Patient will report to the ER if he has thoughts of harming himself or others       3.  Tobacco use   - Currently at 0.5-1 pack per day, cutting back  - Smoking for approximately 30 years (>30 pack year history)  - CT screening on next visit  - Encourage cessation, does not want to restart Chantix  - Has difficulty gaining weight     Other issues covered by other providers:  History of SMA syndrome and Bypass surgery - Still has decreased appetite, low BMI, sees GI  GERD - Sees GI, recent EGD, getting records, placed on Nexium  Esophageal Stricture - Sees GI, gets dilated periodically  HAYES - Sees GI, no US or biopsy results to confirm in chart      Return in about 1 month (around 10/12/2022). Diagnostic data:   I have reviewed recent diagnostic testing including labs with patient today. I have reviewed the notes from the prior visit and ER visit. I have reviewed the labs for CMP, CBC, EKG, CXR, Flu test, and Alpha-1 antitrypsin. SUBJECTIVE/OBJECTIVE:    Laura Yadav is a 47 y.o. male who presents for a New patient visit. He has a history of GERD, Tobacco use, and COPD. Recent Flu A URI:  Seen at the ER for chest pain and cough. Ruled out ACS at the time. He was given Zofran and Maalox for home use at discharge. CXR showed focal atelectasis in the left upper lobe 9/6/22 (previous history of collapse in that lung). Flu swab positive for Flu A on 9/6/22. COPD versus Restrictive disease:  Patient used to take Spiriva and albuterol inhaler as needed for symptoms. Last PFTs in 2017 showed decreased FVC and FEV1 at ~60% of predicted, however the ratio is increased to ~110% of predicted. Noted >12% increase in FVC and FEV1 with bronchodilator administration. TLC is 95% of predicted. This is a picture more accurate with Restrictive lung disease then just COPD. Has to take breaks while mowing the yard. States bilateral back pain \"where his lungs are at\", relieved with coughing. Had an SpO2 on 81% post EGD. No sleep studies preformed. Gets short of breath sitting down and resting at times. GERD/HAYES:  Patient takes OTC Antacid as needed. History of Gastroduodenitis in 2017 with EGD. No follow up EGD in records. EGD in 2017 also showed evidence of duodenojejunal bypass surgery for SMA syndrome. Recent EGD in 7/2022, following with Dr. Margo Oro. States having episodes where he can't eat because it feels like he is going to throw up. Needs intermittent esophageal dilation. Is on Nexium.     Tobacco use:  Still smoking, trying to cut back. Used to be on Chantix. 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 0.5-1 pack per day. About 30 years of smoking. Sister  of lung cancer. Elevated ALP:  Consistently elevated ALP for years on chart review, most recent 161 on 22 and stable compared to previous. Listed history of HAYES in the chart, no biopsy results found. Macrocytosis without Anemia:  .6 on 22, consistent with previous values for years. Likely secondary to HAYES as above. Continue to monitor periodically. Bipolar 1 disorder/Depression:  Takes no medications, listed in chart. Does not see a psychologist currently. States \"a lot going on\" Granddaughter  1 year ago. Son having seizures more frequently, almost 12 a month, needs to see a specialist.  No suicidal ideation. No suicide plan. States his kids need him and wouldn't think about ending it. [facility] needs a referral.  Was on seroquel in the past, makes him feel like a zombie. Has called the office asking to be seen. Getting sleep, but very broken and not good quality. ?Biatrial Enlargement:  Listed on EKG on 2022 in the ER. Patient is thin, may be voltage increase due to habitus. Last EF 56% with normal stress echo in 2018. History of several family members with bad heart valves. Leg Pain:  Mainly in the Right leg in the back while walking. Stopping walking helps the most.  Wonders if it a clogged artery.       Past Medical History:   Diagnosis Date    Asthma     Bipolar 1 disorder (HCC)     COPD (chronic obstructive pulmonary disease) (HCC)     GERD (gastroesophageal reflux disease)     IBS (irritable bowel syndrome)     Liver disease     non alcoholic fatty liver    Lung collapse     Left       Past Surgical History:   Procedure Laterality Date    AORTO-SMA BYPASS GRAFT      CARDIAC SURGERY      heart cath-no stents    CHOLECYSTECTOMY      COLONOSCOPY      ENDOSCOPY, COLON, DIAGNOSTIC      TONSILLECTOMY  2010       Current Outpatient Medications   Medication Sig Dispense Refill    esomeprazole (NEXIUM) 40 MG delayed release capsule Take 40 mg by mouth every morning (before breakfast)      budesonide-formoterol (SYMBICORT) 160-4.5 MCG/ACT AERO Inhale 2 puffs into the lungs 2 times daily 30.6 g 1    albuterol sulfate HFA (VENTOLIN HFA) 108 (90 Base) MCG/ACT inhaler Inhale 2 puffs into the lungs 4 times daily as needed for Wheezing 18 g 0    ondansetron (ZOFRAN-ODT) 4 MG disintegrating tablet Take 1 tablet by mouth 3 times daily as needed for Nausea or Vomiting 21 tablet 0    Acetaminophen 650 MG TABS Take 650 mg by mouth every 4 hours as needed. 120 tablet      No current facility-administered medications for this visit.        No Known Allergies    Social History     Socioeconomic History    Marital status:      Spouse name: Not on file    Number of children: 3    Years of education: Not on file    Highest education level: Not on file   Occupational History    Not on file   Tobacco Use    Smoking status: Every Day     Packs/day: 0.50     Years: 36.00     Pack years: 18.00     Types: Cigarettes     Start date: 3/23/1975    Smokeless tobacco: Never   Vaping Use    Vaping Use: Never used   Substance and Sexual Activity    Alcohol use: No     Alcohol/week: 0.0 standard drinks    Drug use: No    Sexual activity: Not on file   Other Topics Concern    Not on file   Social History Narrative    Not on file     Social Determinants of Health     Financial Resource Strain: Low Risk     Difficulty of Paying Living Expenses: Not hard at all   Food Insecurity: No Food Insecurity    Worried About Running Out of Food in the Last Year: Never true    Ran Out of Food in the Last Year: Never true   Transportation Needs: Not on file   Physical Activity: Not on file   Stress: Not on file   Social Connections: Not on file   Intimate Partner Violence: Not on file   Housing Stability: Not on file inhalation. No rales or rhonchi, cough sounds rhochorus when auscultated. Symmetric air entry  Heart - normal rate, regular rhythm, normal S1, S2, no murmurs, rubs, clicks or gallops  Abdomen -soft, nontender, nondistended, no organomegaly palpated  Neurological - alert, oriented, cranial nerves II-XII intact, motor and sensation are grossly intact bilateral upper and lower extremities. When walking, he does not move his left arm. Extremities - peripheral pulses normal, no pedal edema, clubbing present on all fingernails, no cyanosis  Skin - warm and dry    An electronic signature was used to authenticate this note. --Quiana Ahumada DO  PGY-3 on 9/12/2022 seen with Dr. Kaylee Monzon. Dino Velásquez  INTERNAL MEDICINE  [address]  Dept: [phone]  Dept Fax: [phone] : [phone]

## 2022-09-13 ENCOUNTER — HOSPITAL ENCOUNTER (OUTPATIENT)
Dept: RESPIRATORY THERAPY | Age: 52
Discharge: HOME | End: 2022-09-13

## 2022-09-13 ENCOUNTER — TELEPHONE (OUTPATIENT)
Dept: INTERNAL MEDICINE CLINIC | Age: 52
End: 2022-09-13

## 2022-09-13 DIAGNOSIS — J41.0 SIMPLE CHRONIC BRONCHITIS (HCC): ICD-10-CM

## 2022-09-13 RX ORDER — ESOMEPRAZOLE MAGNESIUM 40 MG/1
40 CAPSULE, DELAYED RELEASE ORAL
Qty: 30 CAPSULE | Refills: 0 | Status: SHIPPED | OUTPATIENT
Start: 2022-09-13 | End: 2022-10-11 | Stop reason: SDUPTHER

## 2022-09-13 NOTE — TELEPHONE ENCOUNTER
Patient wants their medications and for them to have refills. He states  wanted him to see May Desai who is a Psychologist. The problem is that Domenica Lane told the patient that he has the referral, but he does not do medications. Domenica Lane said either a Psychiatrist be called up or for us to do it. What is your plans to address this?

## 2022-10-09 NOTE — PROGRESS NOTES
[facility]  [place] [address] [address], [address]  Dept: [phone]  Dept Fax: [phone] 1970 is a 47 y.o. male, Established patient, here for evaluations of:  Chief Complaint   Patient presents with    COPD    Other     Bipolar disorder            ASSESSMENT/PLAN:    1. Bipolar affective disorder, currently depressed, mild ([address]   - Reports Jamila Miller is working well  - Denies Suicidal thoughts or ideations  - Continue to monitor for manic mood   - Seeing Psychology in 1 month  - Refilling Latuda 20 mg daily     2. COPD mixed type ([address]   - Gold Stage II with Restrictive Disease seen  - Consider Pulmonary fibrosis  - Has known emphysema with a previous Pneumothorax  - Rechecking PFTs not that he is more stable on his inhalers  - Depending on his results, we will titrate his inhaler regiment  - Tachycardia events likely due to relative hypoxia, monitor at this time     3. Smoker   - 36 pack year history  - Low Dose CT screen ordered  - Encouraged Smoking Cessation  - Providing Scripts for Varenicline, Nicotine Patch, and Lozenge  - Patient may choose which method he prefers, discussed that he should not take the Patch and Lozenge at the same time     4. GERD  - Stable, sees GI  - Refilling Nexium 40 mg daily at this time           Tachycardia presumed from relative hypoxia at this point, ruling out lung causes prior to full cardiac work-up. Return in about 2 months (around 12/11/2022). Diagnostic data:   I have reviewed recent diagnostic testing including labs with patient today. I have reviewed the notes from the prior visit. No new labs to review (Overnight Pulse Ox study pending results)       SUBJECTIVE/OBJECTIVE:    Harriet Cunningham is a 47 y.o. male who presents for a 1 month follow-up for chronic conditions such as Bipolar 1 disorder, Tobacco use, and COPD.      COPD Gold Stage II with Restrictive disease:  Patient restarted on Symbicort and Albuterol inhaler as needed. Last PFTs in 2017 showed decreased FVC and FEV1 at ~60% of predicted, however the ratio is increased to ~110% of predicted. Noted >12% increase in FVC and FEV1 with bronchodilator administration. TLC is 95% of predicted. Previous history of Spontaneous Pneumothorax. Has to take breaks while mowing the yard. States bilateral back pain \"where his lungs are at\", relieved with coughing. Had an SpO2 on 81% post EGD, overnight pulse Ox ordered and showed SpO2 >90% over 99.9% of the time. Inhalers have helped. Coughing has stated the same. No blood or mucus. Pain continued in Lower back. Bipolar 1 disorder/Depression:  Started on Latuda 20 mg daily, helped immensely. States \"a lot going on\" Granddaughter  1 year ago. Son having seizures more frequently, almost 12 a month, needs to see a specialist.  No suicidal ideation. No suicide plan. States his kids need him and wouldn't think about ending it. Referred to [address] [place] [address]. Was on seroquel in the past, makes him feel like a zombie. Getting sleep, but very broken and not good quality. GERD/HAYES:  Patient takes OTC Antacid as needed. History of Gastroduodenitis in 2017 with EGD. Previous duodenojejunal bypass surgery for SMA syndrome. Recent EGD in 2022, following with Dr. Leslie Guzman. States having episodes where he can't eat because it feels like he is going to throw up. Needs intermittent esophageal dilation. Is on Nexium daily. Tobacco use:  Still smoking, trying to cut back, currently 0.5-1 pack per day. Used to be on Chantix. Did have a spontaneous pneumothorax of his left lung 25 years ago. About 30 years of smoking. Sister  of lung cancer. Wants Lung Cancer screening. Interested in quitting, no set date. Elevated ALP:  Consistently elevated ALP for years on chart review, most recent 161 on 22 and stable compared to previous.   Listed history of HAYES in the chart, no biopsy results found. Macrocytosis without Anemia:  .6 on 9/6/22, consistent with previous values for years. Likely secondary to HAYES as above. Continue to monitor periodically. ?Biatrial Enlargement:  Listed on EKG on 9/2022 in the ER. Patient is thin, may be voltage increase due to habitus. Last EF 56% with normal stress echo in 2018. History of several family members with bad heart valves. States occasional racing heart rate that he can hear in his ears as a ringing noise. Right Leg Pain:  Right leg hurting all the time, from the quadricepts down. Not better with rest.  More of a cramping sensation that shooting or electrical pain. Occasional pain in left leg. Other medical conditions covered by other providers:  History of SMA syndrome and Bypass surgery - Still has decreased appetite, low BMI, sees GI  GERD - Sees GI, recent EGD, getting records, placed on Nexium  Esophageal Stricture - Sees GI, gets dilated periodically  HAYES - Sees GI, no US or biopsy results to confirm in chart    ---------------------------------------------------------------------------------    Review of Systems:  General ROS: negative for - chills or fever  Psychological ROS: Improved on the Latuda, no suicidal ideations negative for - anxiety and depression  Hematological and Lymphatic ROS: No history of blood clots or bleeding disorder.    Respiratory ROS: Breathing improved with Inhalers no cough, shortness of breath, or wheezing  Cardiovascular ROS: no chest pain or dyspnea on exertion, tolerates a flight of stairs, vacuuming  Gastrointestinal ROS: Sometimes wakes up with nausea no abdominal pain, change in bowel habits, or black or bloody stools  Genito-Urinary ROS: no dysuria, trouble voiding, or hematuria  Musculoskeletal ROS: Pain in Right leg \"all the way down\" negative for - muscle pain or muscular weakness  Neurological ROS: negative for - headaches, numbness/tingling, seizures or weakness  Dermatological ROS: negative for - rash or skin lesion changes    Blood pressure 108/76, pulse 96, temperature 97.3 °F (36.3 °C), weight 109 lb 3.2 oz (49.5 kg), SpO2 99 %. Physical Examination:   General appearance - alert, thin, and in no distress  Mental status - alert, oriented to person, place, and time  Head - atraumatic, normocephalic  Eyes - pupils equal and reactive to light, extraocular muscles intact  Ears - external ears are normal, hearing is grossly normal  Mouth - oral pharynx clear, mucous membranes moist, Tonsils removed, brownish coloring on tongue from smoking  Neck - supple, no significant adenopathy in the cervical chain  Chest - End expiratory wheezing heard bilaterally (improved since previous exam), prolonged expiratory phase compared to inhalation. No rales or rhonchi, cough sounds rhochorus when auscultated. Symmetric air entry  Heart - normal rate, regular rhythm, normal S1, S2, no murmurs, rubs, clicks or gallops  Abdomen - soft, nontender, nondistended, no organomegaly palpated  Neurological - alert, oriented, when walking, he does not move his left arm much. Extremities - peripheral pulses normal, no pedal edema, clubbing present on all fingernails, no cyanosis  Skin - warm and dry    An electronic signature was used to authenticate this note. --Emerson Coffey DO  PGY-3 on 10/11/2022 seen with Dr. Davie Sosa. Domenica Morales.  [address]  Dept: [phone]  Dept Fax: [phone] : [phone]

## 2022-10-11 ENCOUNTER — OFFICE VISIT (OUTPATIENT)
Dept: INTERNAL MEDICINE CLINIC | Age: 52
End: 2022-10-11

## 2022-10-11 VITALS
TEMPERATURE: 97.3 F | DIASTOLIC BLOOD PRESSURE: 76 MMHG | WEIGHT: 109.2 LBS | BODY MASS INDEX: 17.63 KG/M2 | HEART RATE: 96 BPM | SYSTOLIC BLOOD PRESSURE: 108 MMHG | OXYGEN SATURATION: 99 %

## 2022-10-11 DIAGNOSIS — F32.A DEPRESSIVE EPISODE: ICD-10-CM

## 2022-10-11 DIAGNOSIS — F31.9 BIPOLAR AFFECTIVE DISORDER, REMISSION STATUS UNSPECIFIED (HCC): ICD-10-CM

## 2022-10-11 DIAGNOSIS — F17.200 SMOKER: ICD-10-CM

## 2022-10-11 DIAGNOSIS — K21.9 GASTROESOPHAGEAL REFLUX DISEASE, UNSPECIFIED WHETHER ESOPHAGITIS PRESENT: ICD-10-CM

## 2022-10-11 DIAGNOSIS — J44.9 COPD MIXED TYPE (HCC): ICD-10-CM

## 2022-10-11 DIAGNOSIS — F31.31 BIPOLAR AFFECTIVE DISORDER, CURRENTLY DEPRESSED, MILD (HCC): Primary | ICD-10-CM

## 2022-10-11 RX ORDER — POLYETHYLENE GLYCOL 3350 17 G
2 POWDER IN PACKET (EA) ORAL PRN
Qty: 100 EACH | Refills: 3 | Status: SHIPPED | OUTPATIENT
Start: 2022-10-11

## 2022-10-11 RX ORDER — ESOMEPRAZOLE MAGNESIUM 40 MG/1
40 CAPSULE, DELAYED RELEASE ORAL
Qty: 30 CAPSULE | Refills: 0 | Status: SHIPPED | OUTPATIENT
Start: 2022-10-11

## 2022-11-01 ENCOUNTER — HOSPITAL ENCOUNTER (OUTPATIENT)
Dept: CT IMAGING | Age: 52
Discharge: HOME | End: 2022-11-01
Payer: MEDICARE

## 2022-11-01 ENCOUNTER — HOSPITAL ENCOUNTER (OUTPATIENT)
Dept: PULMONOLOGY | Age: 52
Discharge: HOME | End: 2022-11-01
Payer: MEDICARE

## 2022-11-01 DIAGNOSIS — F17.200 SMOKER: ICD-10-CM

## 2022-11-01 DIAGNOSIS — J44.9 COPD MIXED TYPE (HCC): ICD-10-CM

## 2022-11-01 PROCEDURE — 94726 PLETHYSMOGRAPHY LUNG VOLUMES: CPT

## 2022-11-01 PROCEDURE — 71271 CT THORAX LUNG CANCER SCR C-: CPT

## 2022-11-01 PROCEDURE — 94060 EVALUATION OF WHEEZING: CPT

## 2022-11-01 PROCEDURE — 94729 DIFFUSING CAPACITY: CPT

## 2023-03-28 ENCOUNTER — HOSPITAL ENCOUNTER (EMERGENCY)
Age: 53
Discharge: HOME | End: 2023-03-28
Attending: EMERGENCY MEDICINE
Payer: MEDICARE

## 2023-03-28 ENCOUNTER — APPOINTMENT (OUTPATIENT)
Dept: GENERAL RADIOLOGY | Age: 53
End: 2023-03-28
Payer: MEDICARE

## 2023-03-28 VITALS
DIASTOLIC BLOOD PRESSURE: 87 MMHG | TEMPERATURE: 98.5 F | HEIGHT: 66 IN | RESPIRATION RATE: 18 BRPM | OXYGEN SATURATION: 98 % | HEART RATE: 79 BPM | SYSTOLIC BLOOD PRESSURE: 119 MMHG | WEIGHT: 110 LBS | BODY MASS INDEX: 17.68 KG/M2

## 2023-03-28 DIAGNOSIS — R05.3 CHRONIC COUGH: Primary | ICD-10-CM

## 2023-03-28 LAB
ALBUMIN SERPL BCG-MCNC: 4.5 G/DL (ref 3.5–5.1)
ALP SERPL-CCNC: 174 U/L (ref 38–126)
ALT SERPL W/O P-5'-P-CCNC: 12 U/L (ref 11–66)
ANION GAP SERPL CALC-SCNC: 10 MEQ/L (ref 8–16)
AST SERPL-CCNC: 20 U/L (ref 5–40)
BASOPHILS ABSOLUTE: 0 THOU/MM3 (ref 0–0.1)
BASOPHILS NFR BLD AUTO: 0.2 %
BILIRUB SERPL-MCNC: 0.4 MG/DL (ref 0.3–1.2)
BUN SERPL-MCNC: 9 MG/DL (ref 7–22)
CALCIUM SERPL-MCNC: 9.4 MG/DL (ref 8.5–10.5)
CHLORIDE SERPL-SCNC: 97 MEQ/L (ref 98–111)
CO2 SERPL-SCNC: 25 MEQ/L (ref 23–33)
CREAT SERPL-MCNC: 0.9 MG/DL (ref 0.4–1.2)
DEPRECATED RDW RBC AUTO: 45.9 FL (ref 35–45)
EKG ATRIAL RATE: 79 BPM
EKG P AXIS: 78 DEGREES
EKG P-R INTERVAL: 112 MS
EKG Q-T INTERVAL: 362 MS
EKG QRS DURATION: 84 MS
EKG QTC CALCULATION (BAZETT): 415 MS
EKG R AXIS: 85 DEGREES
EKG T AXIS: 76 DEGREES
EKG VENTRICULAR RATE: 79 BPM
EOSINOPHIL NFR BLD AUTO: 0.7 %
EOSINOPHILS ABSOLUTE: 0.1 THOU/MM3 (ref 0–0.4)
ERYTHROCYTE [DISTWIDTH] IN BLOOD BY AUTOMATED COUNT: 12.2 % (ref 11.5–14.5)
FLUAV RNA RESP QL NAA+PROBE: NOT DETECTED
FLUBV RNA RESP QL NAA+PROBE: NOT DETECTED
GFR SERPL CREATININE-BSD FRML MDRD: > 60 ML/MIN/1.73M2
GLUCOSE SERPL-MCNC: 104 MG/DL (ref 70–108)
HCT VFR BLD AUTO: 47.8 % (ref 42–52)
HGB BLD-MCNC: 16.9 GM/DL (ref 14–18)
IMM GRANULOCYTES # BLD AUTO: 0.03 THOU/MM3 (ref 0–0.07)
IMM GRANULOCYTES NFR BLD AUTO: 0.4 %
LYMPHOCYTES ABSOLUTE: 2.8 THOU/MM3 (ref 1–4.8)
LYMPHOCYTES NFR BLD AUTO: 33.6 %
MCH RBC QN AUTO: 35.4 PG (ref 26–33)
MCHC RBC AUTO-ENTMCNC: 35.4 GM/DL (ref 32.2–35.5)
MCV RBC AUTO: 100.2 FL (ref 80–94)
MONOCYTES ABSOLUTE: 0.5 THOU/MM3 (ref 0.4–1.3)
MONOCYTES NFR BLD AUTO: 5.6 %
NEUTROPHILS NFR BLD AUTO: 59.5 %
NRBC BLD AUTO-RTO: 0 /100 WBC
OSMOLALITY SERPL CALC.SUM OF ELEC: 263.5 MOSMOL/KG (ref 275–300)
PLATELET # BLD AUTO: 241 THOU/MM3 (ref 130–400)
PMV BLD AUTO: 9.3 FL (ref 9.4–12.4)
POTASSIUM SERPL-SCNC: 3.8 MEQ/L (ref 3.5–5.2)
PROT SERPL-MCNC: 7.7 G/DL (ref 6.1–8)
RBC # BLD AUTO: 4.77 MILL/MM3 (ref 4.7–6.1)
SARS-COV-2 RNA RESP QL NAA+PROBE: NOT DETECTED
SEGMENTED NEUTROPHILS ABSOLUTE COUNT: 5 THOU/MM3 (ref 1.8–7.7)
SODIUM SERPL-SCNC: 132 MEQ/L (ref 135–145)
TROPONIN T: < 0.01 NG/ML
WBC # BLD AUTO: 8.4 THOU/MM3 (ref 4.8–10.8)

## 2023-03-28 PROCEDURE — 93010 ELECTROCARDIOGRAM REPORT: CPT | Performed by: INTERNAL MEDICINE

## 2023-03-28 PROCEDURE — 85025 COMPLETE CBC W/AUTO DIFF WBC: CPT

## 2023-03-28 PROCEDURE — 84484 ASSAY OF TROPONIN QUANT: CPT

## 2023-03-28 PROCEDURE — 71046 X-RAY EXAM CHEST 2 VIEWS: CPT

## 2023-03-28 PROCEDURE — 99285 EMERGENCY DEPT VISIT HI MDM: CPT

## 2023-03-28 PROCEDURE — 36415 COLL VENOUS BLD VENIPUNCTURE: CPT

## 2023-03-28 PROCEDURE — 93005 ELECTROCARDIOGRAM TRACING: CPT | Performed by: EMERGENCY MEDICINE

## 2023-03-28 PROCEDURE — 87636 SARSCOV2 & INF A&B AMP PRB: CPT

## 2023-03-28 PROCEDURE — 80053 COMPREHEN METABOLIC PANEL: CPT

## 2023-03-28 ASSESSMENT — ENCOUNTER SYMPTOMS
COUGH: 1
EYES NEGATIVE: 1
ALLERGIC/IMMUNOLOGIC NEGATIVE: 1

## 2023-03-28 ASSESSMENT — PAIN - FUNCTIONAL ASSESSMENT
PAIN_FUNCTIONAL_ASSESSMENT: 0-10
PAIN_FUNCTIONAL_ASSESSMENT: 0-10

## 2023-03-28 ASSESSMENT — PAIN DESCRIPTION - LOCATION: LOCATION: BACK

## 2023-03-28 ASSESSMENT — PAIN DESCRIPTION - DESCRIPTORS: DESCRIPTORS: BURNING

## 2023-03-28 ASSESSMENT — PAIN SCALES - GENERAL
PAINLEVEL_OUTOF10: 3
PAINLEVEL_OUTOF10: 3

## 2023-03-28 NOTE — DISCHARGE INSTRUCTIONS
Take your medication as indicated and prescribed. If you were given a prescription for prednisone or any other steroid then, take Pepcid (famotidine - over the counter) every day while you are taking the steroids. If you are a diabetic, you should check your blood sugar more frequently while taking prednisone. Use your inhaler or nebulizer as prescribed, or at minimum every 4 hours while you are having an asthma attack. If you are given an antibiotic, then make sure you get the prescription filled and take the antibiotics until finished. Drink plenty of water while taking the antibiotics. Avoid drinking alcohol or drinks that have caffeine in it while taking antibiotics. Being around people that smoke, ofelia houses, weather change can cause an asthma exacerbation. If you smoke, then you should discuss with your physician about ways to quit smoking. PLEASE RETURN TO THE EMERGENCY DEPARTMENT IMMEDIATELY for worsening symptoms of shortness of breath, wheezing, change in the amount of sputum that you cough up or a change in the color of your sputum, using your inhaler more frequently or if your inhaler only lasts up to 2 hours, or if you develop any concerning symptoms such as: high fever not relieved by acetaminophen (Tylenol) and/or ibuprofen (Motrin / Advil), chills, shortness of breath, chest pain, feeling of your heart fluttering or racing, persistent nausea and/or vomiting, vomiting up blood, blood in your stool, loss of consciousness, numbness, weakness or tingling in the arms or legs or change in color of the extremities, changes in mental status, persistent headache, blurry vision, loss of bladder / bowel control, unable to follow up with your physician, or other any other care or concern.

## 2023-03-28 NOTE — ED PROVIDER NOTES
[facility] ENCOUNTER          Pt Name: Javier Angelo  MRN: [id number]  [place] 1970  Date of evaluation: 3/28/2023  Emergency Physician: Amaya Montana MD    CHIEF COMPLAINT       Chief Complaint   Patient presents with    Cough    Back Pain     History obtained from the patient. HISTORY OF PRESENT ILLNESS    HPI  Javier Angelo is a 47 y.o. male with past medical history of COPD, bipolar disorder, anxiety who presents to the emergency department for evaluation of chronic unchanged and nonproductive cough as well as a burning sensation across his bilateral mid back that he noticed this morning. He denies any recent falls or trauma. He denies any rashes along his back. Denies fevers or chills, lower extremity swelling, abdominal pain, nausea, vomiting. Patient states that he is here today citing his anxiety because one of his sisters recently passed away from lung cancer. The patient has no other acute complaints at this time. REVIEW OF SYSTEMS   Review of Systems   Constitutional: Negative. HENT: Negative. Eyes: Negative. Respiratory:  Positive for cough. Cardiovascular:  Negative for chest pain. Endocrine: Negative. Genitourinary: Negative. Musculoskeletal: Negative. Skin:  Negative for rash and wound. Allergic/Immunologic: Negative. Neurological: Negative. Hematological: Negative. Psychiatric/Behavioral: Negative. See HPI.  12 point ROS performed, pertinent positives listed above otherwise negative  PAST MEDICAL AND SURGICAL HISTORY     Past Medical History:   Diagnosis Date    Asthma     Bipolar 1 disorder (HCC)     COPD (chronic obstructive pulmonary disease) (HCC)     GERD (gastroesophageal reflux disease)     IBS (irritable bowel syndrome)     Liver disease     non alcoholic fatty liver    Lung collapse     Left     Past Surgical History:   Procedure Laterality Date    AORTO-SMA BYPASS

## 2023-03-28 NOTE — ED TRIAGE NOTES
Pt presents to the ER with c/o a cough and back pain that started this morning. Pt states the pain is a \"burning sensation\" and it is worse when he takes a deep breath. Pt has hx of COPD and reports using his inhalers & breathing treatments this morning. Pt also reports some SOB, but states \"not any worse than normal.\" Pt is alert and oriented, respirations even and unlabored. VSS. EKG completed and given to provider.

## 2023-11-22 ENCOUNTER — OFFICE VISIT (OUTPATIENT)
Dept: INTERNAL MEDICINE CLINIC | Age: 53
End: 2023-11-22
Payer: COMMERCIAL

## 2023-11-22 VITALS
BODY MASS INDEX: 18.39 KG/M2 | OXYGEN SATURATION: 98 % | DIASTOLIC BLOOD PRESSURE: 88 MMHG | SYSTOLIC BLOOD PRESSURE: 126 MMHG | WEIGHT: 114.4 LBS | HEART RATE: 75 BPM | HEIGHT: 66 IN

## 2023-11-22 DIAGNOSIS — R06.81 WITNESSED EPISODE OF APNEA: ICD-10-CM

## 2023-11-22 DIAGNOSIS — G47.19 EXCESSIVE DAYTIME SLEEPINESS: Primary | ICD-10-CM

## 2023-11-22 DIAGNOSIS — R06.83 SNORING: ICD-10-CM

## 2023-11-22 DIAGNOSIS — Z72.0 TOBACCO USE: ICD-10-CM

## 2023-11-22 DIAGNOSIS — K21.9 GASTROESOPHAGEAL REFLUX DISEASE, UNSPECIFIED WHETHER ESOPHAGITIS PRESENT: ICD-10-CM

## 2023-11-22 DIAGNOSIS — G47.00 FREQUENT NOCTURNAL AWAKENING: ICD-10-CM

## 2023-11-22 DIAGNOSIS — J41.0 SIMPLE CHRONIC BRONCHITIS (HCC): ICD-10-CM

## 2023-11-22 DIAGNOSIS — G47.30 SLEEP APNEA, UNSPECIFIED TYPE: ICD-10-CM

## 2023-11-22 PROCEDURE — 99214 OFFICE O/P EST MOD 30 MIN: CPT

## 2023-11-22 PROCEDURE — G8484 FLU IMMUNIZE NO ADMIN: HCPCS

## 2023-11-22 PROCEDURE — 4004F PT TOBACCO SCREEN RCVD TLK: CPT

## 2023-11-22 PROCEDURE — 3017F COLORECTAL CA SCREEN DOC REV: CPT

## 2023-11-22 PROCEDURE — 3023F SPIROM DOC REV: CPT

## 2023-11-22 PROCEDURE — G8427 DOCREV CUR MEDS BY ELIG CLIN: HCPCS

## 2023-11-22 PROCEDURE — G8419 CALC BMI OUT NRM PARAM NOF/U: HCPCS

## 2023-11-22 RX ORDER — ALBUTEROL SULFATE 90 UG/1
2 AEROSOL, METERED RESPIRATORY (INHALATION) 4 TIMES DAILY PRN
Qty: 18 G | Refills: 0 | Status: SHIPPED | OUTPATIENT
Start: 2023-11-22

## 2023-11-22 RX ORDER — BUDESONIDE AND FORMOTEROL FUMARATE DIHYDRATE 160; 4.5 UG/1; UG/1
2 AEROSOL RESPIRATORY (INHALATION) 2 TIMES DAILY
Qty: 30.6 G | Refills: 1 | Status: SHIPPED | OUTPATIENT
Start: 2023-11-22

## 2023-11-22 SDOH — ECONOMIC STABILITY: INCOME INSECURITY: HOW HARD IS IT FOR YOU TO PAY FOR THE VERY BASICS LIKE FOOD, HOUSING, MEDICAL CARE, AND HEATING?: NOT HARD AT ALL

## 2023-11-22 SDOH — ECONOMIC STABILITY: FOOD INSECURITY: WITHIN THE PAST 12 MONTHS, YOU WORRIED THAT YOUR FOOD WOULD RUN OUT BEFORE YOU GOT MONEY TO BUY MORE.: NEVER TRUE

## 2023-11-22 SDOH — ECONOMIC STABILITY: FOOD INSECURITY: WITHIN THE PAST 12 MONTHS, THE FOOD YOU BOUGHT JUST DIDN'T LAST AND YOU DIDN'T HAVE MONEY TO GET MORE.: NEVER TRUE

## 2023-11-22 SDOH — ECONOMIC STABILITY: HOUSING INSECURITY
IN THE LAST 12 MONTHS, WAS THERE A TIME WHEN YOU DID NOT HAVE A STEADY PLACE TO SLEEP OR SLEPT IN A SHELTER (INCLUDING NOW)?: NO

## 2023-11-22 ASSESSMENT — PATIENT HEALTH QUESTIONNAIRE - PHQ9
6. FEELING BAD ABOUT YOURSELF - OR THAT YOU ARE A FAILURE OR HAVE LET YOURSELF OR YOUR FAMILY DOWN: 0
9. THOUGHTS THAT YOU WOULD BE BETTER OFF DEAD, OR OF HURTING YOURSELF: 0
SUM OF ALL RESPONSES TO PHQ QUESTIONS 1-9: 0
3. TROUBLE FALLING OR STAYING ASLEEP: 0
4. FEELING TIRED OR HAVING LITTLE ENERGY: 0
SUM OF ALL RESPONSES TO PHQ QUESTIONS 1-9: 0
SUM OF ALL RESPONSES TO PHQ QUESTIONS 1-9: 0
7. TROUBLE CONCENTRATING ON THINGS, SUCH AS READING THE NEWSPAPER OR WATCHING TELEVISION: 0
SUM OF ALL RESPONSES TO PHQ QUESTIONS 1-9: 0
1. LITTLE INTEREST OR PLEASURE IN DOING THINGS: 0
SUM OF ALL RESPONSES TO PHQ9 QUESTIONS 1 & 2: 0
10. IF YOU CHECKED OFF ANY PROBLEMS, HOW DIFFICULT HAVE THESE PROBLEMS MADE IT FOR YOU TO DO YOUR WORK, TAKE CARE OF THINGS AT HOME, OR GET ALONG WITH OTHER PEOPLE: 0
8. MOVING OR SPEAKING SO SLOWLY THAT OTHER PEOPLE COULD HAVE NOTICED. OR THE OPPOSITE, BEING SO FIGETY OR RESTLESS THAT YOU HAVE BEEN MOVING AROUND A LOT MORE THAN USUAL: 0
5. POOR APPETITE OR OVEREATING: 0
2. FEELING DOWN, DEPRESSED OR HOPELESS: 0

## 2023-11-22 ASSESSMENT — COLUMBIA-SUICIDE SEVERITY RATING SCALE - C-SSRS
5. HAVE YOU STARTED TO WORK OUT OR WORKED OUT THE DETAILS OF HOW TO KILL YOURSELF? DO YOU INTEND TO CARRY OUT THIS PLAN?: NO
4. HAVE YOU HAD THESE THOUGHTS AND HAD SOME INTENTION OF ACTING ON THEM?: NO
7. DID THIS OCCUR IN THE LAST THREE MONTHS: NO
3. HAVE YOU BEEN THINKING ABOUT HOW YOU MIGHT KILL YOURSELF?: NO

## 2023-11-22 NOTE — PROGRESS NOTES
PATIENT NAME: DEWEY MUELLER  : 1970  DOS: 2023    This is a 53-year-old male with a past medical history of an unspecified mood disorder, unspecified lung disease, GERD, Hx SMA syndrome with subsequent bypass surgery who presents for follow-up evaluation at the internal medicine residency clinic.  Patient was last seen on 10/11/2022.  At that time, the patient was identified to have what was suspected to be bipolar 1 disorder with depressive symptoms, GERD/HAYES for which she had had an EGD in  with intermittent esophageal dilation, tobacco use disorder of 0.5 to 1 pack/day, suspected biatrial enlargement with patient endorsing occasional palpitations that he could hear in his years as well as right leg cramping.  At that time, a full pulmonary workup was initiated.    Since his last appointment, the following data points have been collected. 2023 CBC WBC 8.4, Hb 16.9, HCT 47.8, .2, platelets 241.  CMP glucose 104, creatinine 0.9, BUN 9, sodium 132, potassium 3.8, chloride 97, bicarb 25, calcium 9.4, AST 20, alkaline phosphatase 174, albumin 4.5, total bilirubin 0.4, ALT 12.  Troponin less than 0.010.  Anion gap 10.  eGFR greater than 60.  2023 two-view CXR showing mild diffuse COPD with no acute pathology identified.    2022 pulmonary function testing showing FEV1/FVC 87% with FEV1 78% of predicted, FVC 71% of predicted.   RV/% of predicted.  DL/VA 94% of predicted.  Official interpretation by pulmonologist indicating mild hyperinflation as well as moderately diminished diffusion.    Since the patient's last appointment, medication reconciliation indicates that he has taken no medications.  He has not followed up with any other providers.    He states that he is still having persistent \"buzzing\" in his ears.  He states that this is equal in both ears and is worse in the mornings and he feels as though he is hearing his heartbeat.  He states that it is more prominent

## 2023-11-22 NOTE — PATIENT INSTRUCTIONS
Follow up with Dr. Franklin  Follow up with [facility] for your mood medications [phone]  3 prescriptions: Inhalers, Nexium  Follow up sleep study

## 2024-01-03 ENCOUNTER — TELEPHONE (OUTPATIENT)
Dept: PULMONOLOGY | Age: 54
End: 2024-01-03

## 2024-01-03 NOTE — TELEPHONE ENCOUNTER
Called patient to advise of appt change... No VM available... Changed appt from 1/22/24 to 2/20/24  @[phone]

## 2024-01-17 ENCOUNTER — HOSPITAL ENCOUNTER (OUTPATIENT)
Dept: SLEEP CENTER | Age: 54
Discharge: HOME | End: 2024-01-19
Payer: COMMERCIAL

## 2024-01-17 DIAGNOSIS — R06.83 SNORING: ICD-10-CM

## 2024-01-17 DIAGNOSIS — G47.00 FREQUENT NOCTURNAL AWAKENING: ICD-10-CM

## 2024-01-17 DIAGNOSIS — R06.81 WITNESSED EPISODE OF APNEA: ICD-10-CM

## 2024-01-17 DIAGNOSIS — G47.19 EXCESSIVE DAYTIME SLEEPINESS: ICD-10-CM

## 2024-01-17 DIAGNOSIS — G47.30 SLEEP APNEA, UNSPECIFIED TYPE: ICD-10-CM

## 2024-01-17 PROCEDURE — 95810 POLYSOM 6/> YRS 4/> PARAM: CPT

## 2024-01-18 ENCOUNTER — TELEPHONE (OUTPATIENT)
Dept: PULMONOLOGY | Age: 54
End: 2024-01-18

## 2024-01-18 DIAGNOSIS — G47.34 NOCTURNAL HYPOXIA: Primary | ICD-10-CM

## 2024-01-18 NOTE — TELEPHONE ENCOUNTER
I spoke with Dr. Mayito Calhoun, regarding the sleep study report by calling/texting him on his mobile through [employer].

## 2024-01-19 NOTE — PROGRESS NOTES
[address] [address]                               SLEEP STUDY REPORT    PATIENT NAME: DEWEY MUELLER                      :        1970  MED REC NO:   [id number]                           ROOM:  ACCOUNT NO:   [id number]                           ADMIT DATE: 2024  PROVIDER:     Eliane Telles MD    DATE OF STUDY:  2024    BASELINE SLEEP STUDY REPORT    REFERRING PROVIDERS:  Mayito Romo MD/Kristopher Garrido MD    The patient's height is 5 feet 6 inches, weight is 114 pounds with a BMI  of 18.46.    HISTORY:  The patient is a 53-year-old gentleman who was initially  evaluated by Mayito Romo MD, from hospitalist service.  The patient is  scheduled for baseline sleep study to evaluate for sleep apnea.  The  patient had a history of hypersomnia with Delbarton Sleepiness Score of 12  and neck circumference of 13 inches and class III Mallampati airway.   The patient had a history of tonsillectomy.  He was noted to have  witnessed apneas and frequent nocturnal awakenings.  The patient is  scheduled for baseline sleep study to evaluate for sleep apnea.    METHODS:  The patient underwent digital polysomnography in compliance  with the standards and specifications from the AASM Manual including the  simultaneous recording of 3 EEG channels (F4-M1, C4-M1, and O2-M1 with  back up electrodes F3-M2, C3-M2, and O1-M2), 2 EOG channels (E1-M2, and  E2-M1,), EMG (chin, left & right leg), EKG, Nonin pulse oximetry with   less than 2 second averaging time, body position, airflow recorded by  oral-nasal thermal sensor and nasal air pressure transducer, plus  respiratory effort recorded by calibrated respiratory inductance  plethysmography (RIP), flow volume loop, sound and video.  Sleep staging  and scoring followed the standard put forth by the American Academy of  Sleep Medicine and utilized the 1A and 1B obstructive hypopnea

## 2024-01-19 NOTE — PROGRESS NOTES
Contacted by Dr. Eliane Telles on 2024 regarding the patient DEWEY MUELLER ( 1970) polysomnography testing interpretation. Per Dr. Eliane Telles patient did not have evidence of sleep apnea. However the patient did show evidence of overnight oxygen desaturation.    The patient has been diagnosed with COPD in the past; however, PFT from  is not consistent with COPD. Will refrain from using this diagnosis until formal evaluation by a board certified pulmonologist.     Pertinent Past Medical History:      ECHOCARDIOGRAM:  Transthoracic Echocardiography Report (TTE)      Demographics      Patient Name  AMI PALOMO  Gender             Male                 E      MR #          [id number]    Race                                                Ethnicity      Account #     [id number]      Room Number      Accession     [id number]    Date of Study      2016   Number      Date of Birth 1970   Referring          Dylan Ghosh DO                              Physician          Tu VALDERRAMA DO      Age           46 year(s)   Sonographer        RUSSELL Hopkins, RDCS,                                                 RDMS, RVT                                 Interpreting       Cedrick Rosenbaum MD                              Physician     Procedure     Type of Study      TTE procedure:ECHOCARDIOGRAM COMPLETE 2D W DOPPLER W COLOR.     Procedure Date  Date: 2016 Start: 08:08 AM     Study Location: Bedside  Technical Quality: Poor visualization due to body habitus.     Indications:Chest pain.     Additional Medical History:Exsmoker, bipolar, chest pain, gastroesophageal  reflux disease, hypotension, chronic obstructive pulmonary disease, smoker     Patient Status: Routine     Height: 66 inches Weight: 106 pounds BSA: 1.53 m^2 BMI: 17.11 kg/m^2     BP: 104/72 mmHg      Conclusions      Summary   Normal left ventricle size and systolic function. Ejection fraction was   estimated at

## 2024-02-07 ENCOUNTER — OFFICE VISIT (OUTPATIENT)
Dept: PULMONOLOGY | Age: 54
End: 2024-02-07
Payer: COMMERCIAL

## 2024-02-07 ENCOUNTER — OFFICE VISIT (OUTPATIENT)
Dept: INTERNAL MEDICINE CLINIC | Age: 54
End: 2024-02-07
Payer: COMMERCIAL

## 2024-02-07 VITALS
OXYGEN SATURATION: 97 % | TEMPERATURE: 98 F | SYSTOLIC BLOOD PRESSURE: 112 MMHG | HEIGHT: 67 IN | DIASTOLIC BLOOD PRESSURE: 70 MMHG | HEART RATE: 90 BPM | WEIGHT: 112.2 LBS | BODY MASS INDEX: 17.61 KG/M2

## 2024-02-07 VITALS
WEIGHT: 111.6 LBS | SYSTOLIC BLOOD PRESSURE: 110 MMHG | HEART RATE: 82 BPM | OXYGEN SATURATION: 99 % | DIASTOLIC BLOOD PRESSURE: 76 MMHG | RESPIRATION RATE: 18 BRPM | TEMPERATURE: 97 F | BODY MASS INDEX: 17.74 KG/M2

## 2024-02-07 DIAGNOSIS — K58.1 IRRITABLE BOWEL SYNDROME WITH CONSTIPATION: ICD-10-CM

## 2024-02-07 DIAGNOSIS — K75.81 NASH (NONALCOHOLIC STEATOHEPATITIS): ICD-10-CM

## 2024-02-07 DIAGNOSIS — J42 CHRONIC BRONCHITIS, UNSPECIFIED CHRONIC BRONCHITIS TYPE (HCC): Primary | ICD-10-CM

## 2024-02-07 DIAGNOSIS — E43 SEVERE MALNUTRITION (HCC): ICD-10-CM

## 2024-02-07 DIAGNOSIS — K21.9 GASTROESOPHAGEAL REFLUX DISEASE, UNSPECIFIED WHETHER ESOPHAGITIS PRESENT: ICD-10-CM

## 2024-02-07 DIAGNOSIS — F31.31 BIPOLAR AFFECTIVE DISORDER, CURRENTLY DEPRESSED, MILD (HCC): ICD-10-CM

## 2024-02-07 DIAGNOSIS — K52.3 INDETERMINATE COLITIS: ICD-10-CM

## 2024-02-07 DIAGNOSIS — F17.200 SMOKER: ICD-10-CM

## 2024-02-07 DIAGNOSIS — R63.6 UNDERWEIGHT: ICD-10-CM

## 2024-02-07 DIAGNOSIS — R63.4 LOSS OF WEIGHT: Primary | ICD-10-CM

## 2024-02-07 DIAGNOSIS — G47.34 NOCTURNAL HYPOXEMIA: ICD-10-CM

## 2024-02-07 DIAGNOSIS — F41.9 ANXIETY: ICD-10-CM

## 2024-02-07 DIAGNOSIS — Z72.0 TOBACCO USE: ICD-10-CM

## 2024-02-07 PROCEDURE — G8427 DOCREV CUR MEDS BY ELIG CLIN: HCPCS

## 2024-02-07 PROCEDURE — 3017F COLORECTAL CA SCREEN DOC REV: CPT

## 2024-02-07 PROCEDURE — 4004F PT TOBACCO SCREEN RCVD TLK: CPT

## 2024-02-07 PROCEDURE — 3017F COLORECTAL CA SCREEN DOC REV: CPT | Performed by: SPECIALIST

## 2024-02-07 PROCEDURE — 99205 OFFICE O/P NEW HI 60 MIN: CPT | Performed by: SPECIALIST

## 2024-02-07 PROCEDURE — G8484 FLU IMMUNIZE NO ADMIN: HCPCS | Performed by: SPECIALIST

## 2024-02-07 PROCEDURE — 3023F SPIROM DOC REV: CPT | Performed by: SPECIALIST

## 2024-02-07 PROCEDURE — 99214 OFFICE O/P EST MOD 30 MIN: CPT

## 2024-02-07 PROCEDURE — 4004F PT TOBACCO SCREEN RCVD TLK: CPT | Performed by: SPECIALIST

## 2024-02-07 PROCEDURE — G8419 CALC BMI OUT NRM PARAM NOF/U: HCPCS | Performed by: SPECIALIST

## 2024-02-07 PROCEDURE — G8427 DOCREV CUR MEDS BY ELIG CLIN: HCPCS | Performed by: SPECIALIST

## 2024-02-07 PROCEDURE — G8484 FLU IMMUNIZE NO ADMIN: HCPCS

## 2024-02-07 PROCEDURE — G8419 CALC BMI OUT NRM PARAM NOF/U: HCPCS

## 2024-02-07 RX ORDER — BUDESONIDE, GLYCOPYRROLATE, AND FORMOTEROL FUMARATE 160; 9; 4.8 UG/1; UG/1; UG/1
2 AEROSOL, METERED RESPIRATORY (INHALATION) 2 TIMES DAILY
Qty: 1 EACH | Refills: 5 | Status: SHIPPED | OUTPATIENT
Start: 2024-02-07

## 2024-02-07 ASSESSMENT — PATIENT HEALTH QUESTIONNAIRE - PHQ9
9. THOUGHTS THAT YOU WOULD BE BETTER OFF DEAD, OR OF HURTING YOURSELF: 0
SUM OF ALL RESPONSES TO PHQ QUESTIONS 1-9: 0
3. TROUBLE FALLING OR STAYING ASLEEP: 0
SUM OF ALL RESPONSES TO PHQ QUESTIONS 1-9: 0
5. POOR APPETITE OR OVEREATING: 0
8. MOVING OR SPEAKING SO SLOWLY THAT OTHER PEOPLE COULD HAVE NOTICED. OR THE OPPOSITE, BEING SO FIGETY OR RESTLESS THAT YOU HAVE BEEN MOVING AROUND A LOT MORE THAN USUAL: 0
4. FEELING TIRED OR HAVING LITTLE ENERGY: 0
SUM OF ALL RESPONSES TO PHQ9 QUESTIONS 1 & 2: 0
2. FEELING DOWN, DEPRESSED OR HOPELESS: 0
SUM OF ALL RESPONSES TO PHQ QUESTIONS 1-9: 0
7. TROUBLE CONCENTRATING ON THINGS, SUCH AS READING THE NEWSPAPER OR WATCHING TELEVISION: 0
10. IF YOU CHECKED OFF ANY PROBLEMS, HOW DIFFICULT HAVE THESE PROBLEMS MADE IT FOR YOU TO DO YOUR WORK, TAKE CARE OF THINGS AT HOME, OR GET ALONG WITH OTHER PEOPLE: 0
1. LITTLE INTEREST OR PLEASURE IN DOING THINGS: 0
SUM OF ALL RESPONSES TO PHQ QUESTIONS 1-9: 0
6. FEELING BAD ABOUT YOURSELF - OR THAT YOU ARE A FAILURE OR HAVE LET YOURSELF OR YOUR FAMILY DOWN: 0

## 2024-02-07 NOTE — PROGRESS NOTES
After pharmacist chart review, the following recommendations are made:    Established patient due for routine labs. Recommended upon discussion with resident provider to also obtain lipids, HIV screen, and Hep C screening as part of routine health maintenance.     Diandra Forbes RPh  2/7/2024 3:07 PM     For Pharmacy Admin Tracking Only    Program: Medical Group  CPA in place:  No  Recommendation Provided To: Provider: 1 via Verbally to provider  Intervention Detail: Lab(s) Ordered  Intervention Accepted By: Provider: 1  Gap Closed?: No   Time Spent (min): 15

## 2024-02-07 NOTE — PROGRESS NOTES
[facility]    Patient: PARRISH MUELLER, 53 y.o.   : 1970    Patient of Alanna Sanford MD   Referring Provider: Mayito Shafer MD       Subjective     Chief Complaint   Patient presents with    New Patient     New pulm consult. Overnight pulse ox . Cxr & pft in epic.         HPI  Parrish is here for the evaluation of his lung problems.  Patient had a walking pulse oximetry which did not show any significant desaturation and the report is as noted.  Patient also had the pulmonary function testing on # which showed a FVC of 3.05, 71% predicted.  The FEV1 of 2.65 L, 78% predicted with a ratio of 81% and no significant changes noted with the bronchodilators.  Lung volumes showed hyperinflation but TLC is within normal limits.  Diffusion is decreased.  The chest x-ray showing mild COPD.  The CT scan of the chest did not show any specific problems.  Patient has the multiple medical problems as noted below.  Immunizations:  Immunization History   Administered Date(s) Administered    COVID-19, J&J, (age 18y+), IM, 0.5 mL 2022    Pneumococcal Conjugate Vaccine 2015      Past Medical hx   PMH:  Past Medical History:   Diagnosis Date    Asthma     Bipolar 1 disorder (HCC)     COPD (chronic obstructive pulmonary disease) (HCC)     GERD (gastroesophageal reflux disease)     IBS (irritable bowel syndrome)     Liver disease     non alcoholic fatty liver    Lung collapse     Left     SURGICAL HISTORY:  Past Surgical History:   Procedure Laterality Date    AORTO-SMA BYPASS GRAFT      CARDIAC SURGERY      heart cath-no stents    CHOLECYSTECTOMY      COLONOSCOPY      ENDOSCOPY, COLON, DIAGNOSTIC      TONSILLECTOMY       SOCIAL HISTORY:  Social History     Tobacco Use    Smoking status: Every Day     Current packs/day: 1.00     Average packs/day: 1 pack/day for 37.1 years (37.1 ttl pk-yrs)     Types: Cigarettes     Start date:     Smokeless tobacco:

## 2024-02-07 NOTE — PROGRESS NOTES
PATIENT NAME: BRADEN MUELLER  : 1970  DOS: 2024    This is a 53-year-old male with a past medical history of unspecified mood disorder, unspecified lung disease, GERD, history of SMA syndrome with subsequent bypass surgery who presents for follow-up evaluation at the internal medicine residency clinic.  Patient was last seen and evaluated on 2023.  At that encounter, the patient was counseled on smoking cessation and educated on appropriate sleep hygiene.  Polysomnography was ordered as well as a sleep lab referral.  Patient was also encouraged to follow-up at [facility] for advance care recommendations regarding his mood.  Patient was also encouraged to follow-up with gastroenterology for advance care recommendations.    Since the patient's last appointment, he underwent polysomnography on 2024.  There was evidence of moderate snoring with no clinically significant obstructive sleep apnea.  There was evidence of nocturnal hypoxia with the patient spending a total of 16.1 minutes below the oxygen saturation of 88%.  Gilman Sleepiness Scale score of 12.  The results of this study were discussed with Dr. Eliane Telles MD for which a DME order for supplemental oxygen was placed and a pulmonology referral was also placed.  Patient order was also placed for overnight pulse oximetry for further evaluation.    Patient was seen and evaluated in the pulmonology office on 2024 by Dr. Kristopher Garrido.  Patient was given a referral for smoking cessation clinic, alpha 1 antitrypsin testing.  The patient was also switched from budesonide-formoterol to budesonide-glycopyrrolate-formoterol regimen.  Patient is to follow-up in 3 months.    The patient was seen and evaluated at the bedside in no acute distress.  He denies chest pain, chills, nausea, hemoptysis, sputum production, orthopnea, hematochezia, melena, dysuria.  The patient admits to intermittent night sweats, persistently low weight,

## 2024-02-09 ENCOUNTER — TELEPHONE (OUTPATIENT)
Dept: PHARMACY | Age: 54
End: 2024-02-09

## 2024-02-09 NOTE — TELEPHONE ENCOUNTER
Referral to [facility] Medication Management Smoking Cessation Clinic received from Dr. Garrido for Smoking Cessation Patient Education/Counseling.      Called patient, unable to LM, VM not set up.

## 2024-02-20 ENCOUNTER — HOSPITAL ENCOUNTER (OUTPATIENT)
Age: 54
Discharge: HOME | End: 2024-02-20
Payer: COMMERCIAL

## 2024-02-20 ENCOUNTER — OFFICE VISIT (OUTPATIENT)
Dept: PULMONOLOGY | Age: 54
End: 2024-02-20
Payer: COMMERCIAL

## 2024-02-20 VITALS
DIASTOLIC BLOOD PRESSURE: 82 MMHG | HEIGHT: 67 IN | OXYGEN SATURATION: 97 % | HEART RATE: 96 BPM | BODY MASS INDEX: 17.61 KG/M2 | WEIGHT: 112.2 LBS | TEMPERATURE: 98.7 F | SYSTOLIC BLOOD PRESSURE: 116 MMHG

## 2024-02-20 DIAGNOSIS — J42 CHRONIC BRONCHITIS, UNSPECIFIED CHRONIC BRONCHITIS TYPE (HCC): ICD-10-CM

## 2024-02-20 DIAGNOSIS — F17.200 SMOKER: ICD-10-CM

## 2024-02-20 DIAGNOSIS — Z72.0 TOBACCO USE: ICD-10-CM

## 2024-02-20 DIAGNOSIS — K75.81 NASH (NONALCOHOLIC STEATOHEPATITIS): ICD-10-CM

## 2024-02-20 DIAGNOSIS — F41.9 ANXIETY: ICD-10-CM

## 2024-02-20 DIAGNOSIS — F31.31 BIPOLAR AFFECTIVE DISORDER, CURRENTLY DEPRESSED, MILD (HCC): ICD-10-CM

## 2024-02-20 DIAGNOSIS — G47.36 HYPOXEMIA ASSOCIATED WITH SLEEP: Primary | ICD-10-CM

## 2024-02-20 DIAGNOSIS — K52.3 INDETERMINATE COLITIS: ICD-10-CM

## 2024-02-20 DIAGNOSIS — R63.6 UNDERWEIGHT: ICD-10-CM

## 2024-02-20 DIAGNOSIS — K21.9 GASTROESOPHAGEAL REFLUX DISEASE, UNSPECIFIED WHETHER ESOPHAGITIS PRESENT: ICD-10-CM

## 2024-02-20 LAB
A1AT SERPL-MCNC: 173 MG/DL (ref 90–200)
ALBUMIN SERPL BCG-MCNC: 4.5 G/DL (ref 3.5–5.1)
ALP SERPL-CCNC: 184 U/L (ref 38–126)
ALT SERPL W/O P-5'-P-CCNC: 22 U/L (ref 11–66)
ANION GAP SERPL CALC-SCNC: 13 MEQ/L (ref 8–16)
AST SERPL-CCNC: 23 U/L (ref 5–40)
BASOPHILS ABSOLUTE: 0 THOU/MM3 (ref 0–0.1)
BASOPHILS NFR BLD AUTO: 0.2 %
BILIRUB CONJ SERPL-MCNC: < 0.2 MG/DL (ref 0–0.3)
BILIRUB SERPL-MCNC: 0.3 MG/DL (ref 0.3–1.2)
BUN SERPL-MCNC: 10 MG/DL (ref 7–22)
CALCIUM SERPL-MCNC: 9.6 MG/DL (ref 8.5–10.5)
CHLORIDE SERPL-SCNC: 99 MEQ/L (ref 98–111)
CHOLEST SERPL-MCNC: 217 MG/DL (ref 100–199)
CO2 SERPL-SCNC: 22 MEQ/L (ref 23–33)
CREAT SERPL-MCNC: 0.8 MG/DL (ref 0.4–1.2)
DEPRECATED RDW RBC AUTO: 47.8 FL (ref 35–45)
EOSINOPHIL NFR BLD AUTO: 0.9 %
EOSINOPHILS ABSOLUTE: 0.1 THOU/MM3 (ref 0–0.4)
ERYTHROCYTE [DISTWIDTH] IN BLOOD BY AUTOMATED COUNT: 12.4 % (ref 11.5–14.5)
GFR SERPL CREATININE-BSD FRML MDRD: > 60 ML/MIN/1.73M2
GLUCOSE SERPL-MCNC: 103 MG/DL (ref 70–108)
HCT VFR BLD AUTO: 49.4 % (ref 42–52)
HCV IGG SERPL QL IA: NEGATIVE
HDLC SERPL-MCNC: 51 MG/DL
HGB BLD-MCNC: 17 GM/DL (ref 14–18)
IMM GRANULOCYTES # BLD AUTO: 0.02 THOU/MM3 (ref 0–0.07)
IMM GRANULOCYTES NFR BLD AUTO: 0.2 %
LDLC SERPL CALC-MCNC: 140 MG/DL
LYMPHOCYTES ABSOLUTE: 2.7 THOU/MM3 (ref 1–4.8)
LYMPHOCYTES NFR BLD AUTO: 26.4 %
MCH RBC QN AUTO: 35.7 PG (ref 26–33)
MCHC RBC AUTO-ENTMCNC: 34.4 GM/DL (ref 32.2–35.5)
MCV RBC AUTO: 103.8 FL (ref 80–94)
MONOCYTES ABSOLUTE: 0.6 THOU/MM3 (ref 0.4–1.3)
MONOCYTES NFR BLD AUTO: 5.7 %
NEUTROPHILS NFR BLD AUTO: 66.6 %
NRBC BLD AUTO-RTO: 0 /100 WBC
PLATELET # BLD AUTO: 250 THOU/MM3 (ref 130–400)
PMV BLD AUTO: 9.3 FL (ref 9.4–12.4)
POTASSIUM SERPL-SCNC: 4.2 MEQ/L (ref 3.5–5.2)
PROT SERPL-MCNC: 7.9 G/DL (ref 6.1–8)
RBC # BLD AUTO: 4.76 MILL/MM3 (ref 4.7–6.1)
SEGMENTED NEUTROPHILS ABSOLUTE COUNT: 6.8 THOU/MM3 (ref 1.8–7.7)
SODIUM SERPL-SCNC: 134 MEQ/L (ref 135–145)
TRIGL SERPL-MCNC: 132 MG/DL (ref 0–199)
WBC # BLD AUTO: 10.2 THOU/MM3 (ref 4.8–10.8)

## 2024-02-20 PROCEDURE — 82103 ALPHA-1-ANTITRYPSIN TOTAL: CPT

## 2024-02-20 PROCEDURE — 85025 COMPLETE CBC W/AUTO DIFF WBC: CPT

## 2024-02-20 PROCEDURE — 99214 OFFICE O/P EST MOD 30 MIN: CPT | Performed by: SPECIALIST

## 2024-02-20 PROCEDURE — 87389 HIV-1 AG W/HIV-1&-2 AB AG IA: CPT

## 2024-02-20 PROCEDURE — G8427 DOCREV CUR MEDS BY ELIG CLIN: HCPCS | Performed by: SPECIALIST

## 2024-02-20 PROCEDURE — 82248 BILIRUBIN DIRECT: CPT

## 2024-02-20 PROCEDURE — 86803 HEPATITIS C AB TEST: CPT

## 2024-02-20 PROCEDURE — 4004F PT TOBACCO SCREEN RCVD TLK: CPT | Performed by: SPECIALIST

## 2024-02-20 PROCEDURE — 3017F COLORECTAL CA SCREEN DOC REV: CPT | Performed by: SPECIALIST

## 2024-02-20 PROCEDURE — 80061 LIPID PANEL: CPT

## 2024-02-20 PROCEDURE — G8419 CALC BMI OUT NRM PARAM NOF/U: HCPCS | Performed by: SPECIALIST

## 2024-02-20 PROCEDURE — 36415 COLL VENOUS BLD VENIPUNCTURE: CPT

## 2024-02-20 PROCEDURE — 80053 COMPREHEN METABOLIC PANEL: CPT

## 2024-02-20 PROCEDURE — G8484 FLU IMMUNIZE NO ADMIN: HCPCS | Performed by: SPECIALIST

## 2024-02-20 NOTE — PROGRESS NOTES
Neurological: Alert and oriented to person, place, and time. No focal deficits.  Skin: Skin is warm and dry. Patient is not diaphoretic.   Psychiatric: Normal behavior with normal mood and affect.    Diagnostic Data:    Assessment   1. Hypoxemia associated with sleep    2. Smoker    3. Anxiety    4. Bipolar affective disorder, currently depressed, mild (HCC)        Plan   Patient sleep study is reviewed and showed significant insomnia no periodic limb movements and patient AHI is 3.9 and lowest oxygen saturation is 65% total time of 16.1 minutes below 88 and other details are as noted.  I reviewed the sleep study and I discussed with the patient.  After further reviewing the patient is already has an order to get the 2 L of oxygen via nasal cannula which I recommend the patient to check with the DME as well.  Continue his bronchodilator therapy.  Patient is going to participate in the smoking cessation program soon and he has an appointment to get it done.  Congratulated him for willing to get smoking cessation.  Other medical problems are being closely followed by primary attending physician.     Return in about 6 months (around 8/20/2024).       -He was advised to call [employer] regarding supplies if needed.  -He call my office for earlier appointment if needed for worsening of sleep symptoms.  -Parrish Ramos educated about my impression and plan. Patient verbalizes understanding.  Thanks for the consult    **This report has been created using voice recognition software. It may contain minor errors which are inherent in voice recognition technology.**

## 2024-02-21 LAB — HIV 1+2 AB+HIV1 P24 AG SERPL QL IA: NORMAL

## 2024-02-28 ENCOUNTER — TELEPHONE (OUTPATIENT)
Dept: INTERNAL MEDICINE CLINIC | Age: 54
End: 2024-02-28

## 2024-02-28 ENCOUNTER — HOSPITAL ENCOUNTER (OUTPATIENT)
Dept: PHARMACY | Age: 54
Setting detail: THERAPIES SERIES
Discharge: HOME | End: 2024-02-28
Payer: COMMERCIAL

## 2024-02-28 DIAGNOSIS — Z72.0 TOBACCO USE: Primary | ICD-10-CM

## 2024-02-28 DIAGNOSIS — R74.8 ELEVATED ALKALINE PHOSPHATASE LEVEL: Primary | ICD-10-CM

## 2024-02-28 PROCEDURE — 99212 OFFICE O/P EST SF 10 MIN: CPT | Performed by: PHARMACIST

## 2024-02-28 RX ORDER — VARENICLINE TARTRATE 0.5 (11)-1
1 KIT ORAL DAILY
Qty: 1 EACH | Refills: 0 | Status: SHIPPED | OUTPATIENT
Start: 2024-02-28 | End: 2024-03-29

## 2024-02-28 NOTE — PROGRESS NOTES
Medication Management   [facility]  Smoking Cessation Clinic  [phone] (phone)  [phone] (fax)    Parrish Ramos is a 53 y.o. male has been referred to our service by Dr. Garrido for Smoking Cessation Patient Education/Counseling.      Pt reports they are ready and motivated to quit.      History:  Family/friends for support: 3 kids, lives with ex-wife, no other smokers at home  Career: does not work   Home environment/smoke free zones in house: Yes, smokes on front porch  Past Medical history/diagnosis reviewed:  Yes  Medication list reviewed: Yes    Past Medical History:      Past Medical History:   Diagnosis Date    Asthma     Bipolar 1 disorder (HCC)     COPD (chronic obstructive pulmonary disease) (HCC)     GERD (gastroesophageal reflux disease)     IBS (irritable bowel syndrome)     Liver disease     non alcoholic fatty liver    Lung collapse     Left         Scaling:  Importance level: 10  Confidence level: 5    Fagerstrom Test:    How soon after you wake up do you smoke your first cigarette? within 5\"(3)   Do you find it difficult to refrain from smoking in places where it is forbidden, e.g., in Hindu, at library, cinema? No (1 if yes)   Which cigarette would you hate to most give up?  the first one in the morning(1)   How many cigarettes per day do you smoke? 11-20(1)    Do you smoke more frequently during the first hours of waking than during the rest of the day? Yes (1 if yes)   Do you smoke if you are so ill that you are in bed most of the day? No (1 if yes)    Classification of Dependence:  0-2 Very Low  3-4 Low  5    Moderate  6-7 High  8-10 Very High   Score: 6, I went over the results with the pt.     Tobacco use:  Current use:  #/packs per day: 1 PPD,  Age started: 19   Years used: 34  Pack years: 34   How many times in the past have you made a serious attempt to quit smoking? 1  What was the longest period of time you were able to quit smoking? Just a couple of days  When was

## 2024-03-20 ENCOUNTER — TELEPHONE (OUTPATIENT)
Dept: PHARMACY | Age: 54
End: 2024-03-20

## 2024-03-20 NOTE — TELEPHONE ENCOUNTER
Pt did not show for appt for smoking cessation on 3/13 so called at this time to see if pt wants to reschedule.  There was no answer so I left a message asking the pt to call us back at [phone], option 2.

## 2024-04-03 ENCOUNTER — OFFICE VISIT (OUTPATIENT)
Dept: INTERNAL MEDICINE CLINIC | Age: 54
End: 2024-04-03

## 2024-04-03 VITALS
OXYGEN SATURATION: 99 % | DIASTOLIC BLOOD PRESSURE: 71 MMHG | HEART RATE: 81 BPM | BODY MASS INDEX: 18.39 KG/M2 | WEIGHT: 117.2 LBS | SYSTOLIC BLOOD PRESSURE: 134 MMHG | HEIGHT: 67 IN

## 2024-04-03 DIAGNOSIS — R74.8 ELEVATED ALKALINE PHOSPHATASE LEVEL: ICD-10-CM

## 2024-04-03 DIAGNOSIS — R07.9 CHEST PAIN, UNSPECIFIED TYPE: Primary | ICD-10-CM

## 2024-04-03 DIAGNOSIS — F17.210 CIGARETTE NICOTINE DEPENDENCE, UNCOMPLICATED: ICD-10-CM

## 2024-04-03 DIAGNOSIS — K21.9 GASTROESOPHAGEAL REFLUX DISEASE, UNSPECIFIED WHETHER ESOPHAGITIS PRESENT: ICD-10-CM

## 2024-04-03 DIAGNOSIS — M79.672 LEFT FOOT PAIN: ICD-10-CM

## 2024-04-03 DIAGNOSIS — J41.0 SIMPLE CHRONIC BRONCHITIS (HCC): ICD-10-CM

## 2024-04-03 DIAGNOSIS — Z12.2 SCREENING FOR LUNG CANCER: ICD-10-CM

## 2024-04-03 RX ORDER — ALBUTEROL SULFATE 90 UG/1
2 AEROSOL, METERED RESPIRATORY (INHALATION) 4 TIMES DAILY PRN
Qty: 18 G | Refills: 0 | Status: SHIPPED | OUTPATIENT
Start: 2024-04-03

## 2024-04-03 RX ORDER — ATORVASTATIN CALCIUM 40 MG/1
40 TABLET, FILM COATED ORAL DAILY
Qty: 90 TABLET | Refills: 0 | Status: SHIPPED | OUTPATIENT
Start: 2024-04-03

## 2024-04-03 NOTE — PATIENT INSTRUCTIONS
Lung CT Scan  Follow up with Dr. Franklin's office  GGT lab  Stress Test  Cholesterol pill started       Learning About Lung Cancer Screening  What is screening for lung cancer?     Lung cancer screening is a way to find some lung cancers early, before a person has any symptoms of the cancer.  Lung cancer screening may help those who have the highest risk for lung cancer--people age 50 and older who are or were heavy smokers. For most people, who aren't at increased risk, screening for lung cancer probably isn't helpful.  Screening won't prevent cancer. And it may not find all lung cancers. Lung cancer screening may lower the risk of dying from lung cancer in a small number of people.  How is it done?  Lung cancer screening is done with a low-dose CT (computed tomography) scan. A CT scan uses X-rays, or radiation, to make detailed pictures of your body. Experts recommend that screening be done in medical centers that focus on finding and treating lung cancer.  Who is screening recommended for?  Lung cancer screening is recommended for people age 50 and older who are or were heavy smokers. That means people with a smoking history of at least 20 pack years. A pack year is a way to measure how heavy a smoker you are or were.  To figure out your pack years, multiply how many packs a day on average (assuming 20 cigarettes per pack) you have smoked by how many years you have smoked. For example:  If you smoked 1 pack a day for 20 years, that's 1 times 20. So you have a smoking history of 20 pack years.  If you smoked 2 packs a day for 10 years, that's 2 times 10. So you have a smoking history of 20 pack years.  Experts agree that screening is for people who have a high risk of lung cancer. But experts don't agree on what high risk means. Some say people age 50 or older with at least a 20-pack-year smoking history are high risk. Others say it's people age 55 or older with a 30-pack-year history.  To see if you could benefit

## 2024-04-03 NOTE — PROGRESS NOTES
Bronchitis  Hx Left Lung Collapse  Tobacco Use Disorder  - Established with pulmonology  - Last PFT November 2022  - Consideration for overnight pulse oximetry as well as 6 minute walk test per pulmonologist  - PRN Bronchodilator refilled   - Patient tolerating and benefitting from Chantix regimen; patient encouraged to continue follow up with smoking cessation clinic  - Low dose annual CT ordered    Nocturnal Hypoxia  - Patient is compliant and benefiting from supplemental oxygen regimen as prescribed based on nocturnal hypoxia identified on baseline sleep study in January 2024  - Patient endorses having pulse oximeter which he was encouraged to utilize    Chest Pain  - 5/10, substernal, occurring at rest, with intermittent radiation into the left arm  - No prior history of such chest pain; family history of cardiovascular disease  - Tobacco Use disorder and HLD  - Heart Score 3   - EKG obtained in office and showing normal sinus rhythm with previously identified T wave elevations in II, V3, V4, V5  - HEART Score: 4; Stress test ordered     GERD  - Patient is compliant and benefiting from current PPI regimen  - Patient counseled on smoking cessation  - Refill provided for PPI  - Patient encouraged to fulfill GI referral for advanced care recommendations     Left Foot Wound  - Patient endorses previous podiatric intervention on the 4th and 5th digit of his left lower extremity. He is now experiencing pain with ambulation at previous intervention site  - Referral to podiatry provided; wound inspected appears scarred between the 4th and 5th digit with no discharge, discoloration, or bleeding identified    I have spent more than 60 minutes reviewing this patient's chart, conducting a physical examination, interviewing the patient, coordinating care with an attending physician, counseling the patient, and establishing a plan of care.  The opportunity to ask additional questions was provided to the patient.  He voiced

## 2024-04-10 ENCOUNTER — HOSPITAL ENCOUNTER (OUTPATIENT)
Dept: PHARMACY | Age: 54
Setting detail: THERAPIES SERIES
Discharge: HOME | End: 2024-04-10
Payer: COMMERCIAL

## 2024-04-10 ENCOUNTER — HOSPITAL ENCOUNTER (OUTPATIENT)
Age: 54
Discharge: HOME | End: 2024-04-10
Payer: COMMERCIAL

## 2024-04-10 DIAGNOSIS — Z72.0 TOBACCO USE: Primary | ICD-10-CM

## 2024-04-10 DIAGNOSIS — F17.210 CIGARETTE NICOTINE DEPENDENCE, UNCOMPLICATED: ICD-10-CM

## 2024-04-10 DIAGNOSIS — R74.8 ELEVATED ALKALINE PHOSPHATASE LEVEL: ICD-10-CM

## 2024-04-10 LAB — GGT SERPL-CCNC: 21 U/L (ref 8–69)

## 2024-04-10 PROCEDURE — 99211 OFF/OP EST MAY X REQ PHY/QHP: CPT

## 2024-04-10 PROCEDURE — 36415 COLL VENOUS BLD VENIPUNCTURE: CPT

## 2024-04-10 PROCEDURE — 82977 ASSAY OF GGT: CPT

## 2024-04-10 RX ORDER — VARENICLINE TARTRATE 1 MG/1
1 TABLET, FILM COATED ORAL 2 TIMES DAILY
Qty: 60 TABLET | Refills: 0 | Status: SHIPPED | OUTPATIENT
Start: 2024-05-11 | End: 2024-06-10

## 2024-04-10 RX ORDER — VARENICLINE TARTRATE 0.5 (11)-1
1 KIT ORAL DAILY
Qty: 53 EACH | Refills: 0 | Status: SHIPPED | OUTPATIENT
Start: 2024-04-10 | End: 2024-05-10

## 2024-04-10 NOTE — DISCHARGE INSTRUCTIONS
Call PCP/DR if have not heard from them about the chantix.   Fill out the pack wrap paper.   Continue to try and taper daily cigarette usage.

## 2024-04-10 NOTE — PROGRESS NOTES
Medication Management   [facility]  Smoking Cessation Clinic  [phone] (phone)  [phone] (fax)  Parrish Ramos          1970  Mayito Shafer MD    Parrish Ramos is a 53 y.o. male who presents today for visit 2/12.       Previous Goal/Quit Date: N/A                       New Goal/Quit Date: none set yet    Scaling:  Importance level:  10 (previous level was 10 )  Confidence level: 5 (previous level was 5 )    Has the patient smoked ANY cigarettes (even 1 puff) in the last 7 days?: Yes  If yes, how much? 0.5PPD What were you doing/do you know what the trigger was? Boredom, stress, in the morning.    Baseline PPD: 1  Current PPD: 0.5  Smoking Reduction Percent: 50%    Pharmacological Agent used:  pt will be using chantix, see notes below.     PLAN    Discussed the following:     Went over pages 2-5 in smoking cessation booklet  Benefits to quitting:  health, time, financial  Nicotine replacement and pharmacological options - chantix use and side effects.   Cigarette pack wraps to identify triggers explained to pt  Tobacco cessation quit tips    Standard written patient education provided to pt:  Cigarette pack wraps     Notes/comments: Pt states he had a starter pack of Chantix at home and he took that for the 7 days but he is done with that is what the pt states- not sure where he got this start pack or when he finished with it per pt.   Per last appt with KIRSTEN Dutton on 2/28 for smoking cessation she had sent message to Dr Shafer to see if he would be willing to send px for Chantix.  Pt saw Dr Shafer on 4/3 and his note states \"Patient tolerating and benefitting from Chantix regimen; patient encouraged to continue follow up with smoking cessation clinic\".  PT states he has no more chantix and no px for it. Chantix was prescribed by Dr Shafer on 2/28 with amount to last 30 days.  We will send new message to Dr Shafer saying chantix needs prescribed again and would probably need to restart with starter

## 2024-04-11 ENCOUNTER — TELEPHONE (OUTPATIENT)
Dept: INTERNAL MEDICINE CLINIC | Age: 54
End: 2024-04-11

## 2024-04-11 DIAGNOSIS — R74.8 ELEVATED ALKALINE PHOSPHATASE LEVEL: Primary | ICD-10-CM

## 2024-04-12 ENCOUNTER — HOSPITAL ENCOUNTER (OUTPATIENT)
Dept: CT IMAGING | Age: 54
Discharge: HOME | End: 2024-04-12
Payer: COMMERCIAL

## 2024-04-12 DIAGNOSIS — F17.210 CIGARETTE NICOTINE DEPENDENCE, UNCOMPLICATED: ICD-10-CM

## 2024-04-12 DIAGNOSIS — J41.0 SIMPLE CHRONIC BRONCHITIS (HCC): ICD-10-CM

## 2024-04-12 DIAGNOSIS — Z12.2 SCREENING FOR LUNG CANCER: ICD-10-CM

## 2024-04-12 PROCEDURE — 71271 CT THORAX LUNG CANCER SCR C-: CPT

## 2024-04-12 NOTE — TELEPHONE ENCOUNTER
Spoke with patient. He doesn't have any of the listed problems but had CT done today and will call Wednesday when Dr Shafer is in for results.

## 2024-04-15 ENCOUNTER — TELEPHONE (OUTPATIENT)
Dept: PULMONOLOGY | Age: 54
End: 2024-04-15

## 2024-04-15 DIAGNOSIS — G47.36 HYPOXEMIA ASSOCIATED WITH SLEEP: Primary | ICD-10-CM

## 2024-04-15 NOTE — TELEPHONE ENCOUNTER
Pt called clinical line asking if he could increase his oxygen he currently is on 2lpm and said he feels like it isn't enough, so wanted to know if he could increase it and see if it helps, I did tell pt that we could get his appt moved up so he could talk to you about the oxygen, pt wanted a message sent instead to see what you thought, please advise thank you

## 2024-04-17 ENCOUNTER — HOSPITAL ENCOUNTER (OUTPATIENT)
Age: 54
Discharge: HOME | End: 2024-04-19
Payer: COMMERCIAL

## 2024-04-17 ENCOUNTER — HOSPITAL ENCOUNTER (OUTPATIENT)
Dept: NUCLEAR MEDICINE | Age: 54
Discharge: HOME | End: 2024-04-17
Payer: COMMERCIAL

## 2024-04-17 DIAGNOSIS — R07.9 CHEST PAIN, UNSPECIFIED TYPE: ICD-10-CM

## 2024-04-17 LAB
NUC STRESS EJECTION FRACTION: 59 %
STRESS BASELINE DIAS BP: 77 MMHG
STRESS BASELINE HR: 74 BPM
STRESS BASELINE ST DEPRESSION: 0 MM
STRESS BASELINE SYS BP: 121 MMHG
STRESS ST DEPRESSION: 0 MM
STRESS STAGE 1 BP: NORMAL MMHG
STRESS STAGE 1 DURATION: 1 MIN:SEC
STRESS STAGE 1 HR: 99 BPM
STRESS STAGE 2 BP: NORMAL MMHG
STRESS STAGE 2 DURATION: 1 MIN:SEC
STRESS STAGE 2 HR: 115 BPM
STRESS STAGE 3 BP: NORMAL MMHG
STRESS STAGE 3 DURATION: 1 MIN:SEC
STRESS STAGE 3 HR: 102 BPM
STRESS STAGE RECOVERY 1 BP: NORMAL MMHG
STRESS STAGE RECOVERY 1 DURATION: 1 MIN:SEC
STRESS STAGE RECOVERY 1 HR: 97 BPM
STRESS STAGE RECOVERY 2 BP: NORMAL MMHG
STRESS STAGE RECOVERY 2 DURATION: 1 MIN:SEC
STRESS STAGE RECOVERY 2 HR: 97 BPM
STRESS STAGE RECOVERY 3 BP: NORMAL MMHG
STRESS STAGE RECOVERY 3 DURATION: 1 MIN:SEC
STRESS STAGE RECOVERY 3 HR: 99 BPM
STRESS STAGE RECOVERY 4 BP: NORMAL MMHG
STRESS STAGE RECOVERY 4 DURATION: 2 MIN:SEC
STRESS STAGE RECOVERY 4 HR: 99 BPM
STRESS TARGET HR: 166 BPM
TID: 1.06

## 2024-04-17 PROCEDURE — 93018 CV STRESS TEST I&R ONLY: CPT | Performed by: INTERNAL MEDICINE

## 2024-04-17 PROCEDURE — 93016 CV STRESS TEST SUPVJ ONLY: CPT | Performed by: INTERNAL MEDICINE

## 2024-04-17 PROCEDURE — 78452 HT MUSCLE IMAGE SPECT MULT: CPT

## 2024-04-17 PROCEDURE — 93017 CV STRESS TEST TRACING ONLY: CPT

## 2024-04-17 PROCEDURE — A9500 TC99M SESTAMIBI: HCPCS

## 2024-04-17 PROCEDURE — 78452 HT MUSCLE IMAGE SPECT MULT: CPT | Performed by: INTERNAL MEDICINE

## 2024-04-17 PROCEDURE — 6360000002 HC RX W HCPCS: Performed by: INTERNAL MEDICINE

## 2024-04-17 PROCEDURE — 3430000000 HC RX DIAGNOSTIC RADIOPHARMACEUTICAL

## 2024-04-17 RX ORDER — REGADENOSON 0.08 MG/ML
0.4 INJECTION, SOLUTION INTRAVENOUS
Status: COMPLETED | OUTPATIENT
Start: 2024-04-17 | End: 2024-04-17

## 2024-04-17 RX ORDER — TETRAKIS(2-METHOXYISOBUTYLISOCYANIDE)COPPER(I) TETRAFLUOROBORATE 1 MG/ML
8.5 INJECTION, POWDER, LYOPHILIZED, FOR SOLUTION INTRAVENOUS
Status: COMPLETED | OUTPATIENT
Start: 2024-04-17 | End: 2024-04-17

## 2024-04-17 RX ORDER — TETRAKIS(2-METHOXYISOBUTYLISOCYANIDE)COPPER(I) TETRAFLUOROBORATE 1 MG/ML
30.2 INJECTION, POWDER, LYOPHILIZED, FOR SOLUTION INTRAVENOUS
Status: COMPLETED | OUTPATIENT
Start: 2024-04-17 | End: 2024-04-17

## 2024-04-17 RX ADMIN — Medication 30.2 MILLICURIE: at 11:30

## 2024-04-17 RX ADMIN — REGADENOSON 0.4 MG: 0.08 INJECTION, SOLUTION INTRAVENOUS at 11:30

## 2024-04-17 RX ADMIN — Medication 8.5 MILLICURIE: at 10:28

## 2024-04-24 ENCOUNTER — HOSPITAL ENCOUNTER (OUTPATIENT)
Dept: RESPIRATORY THERAPY | Age: 54
Discharge: HOME | End: 2024-04-24
Payer: COMMERCIAL

## 2024-04-24 ENCOUNTER — HOSPITAL ENCOUNTER (OUTPATIENT)
Dept: PHARMACY | Age: 54
Setting detail: THERAPIES SERIES
Discharge: HOME | End: 2024-04-24
Payer: COMMERCIAL

## 2024-04-24 DIAGNOSIS — G47.36 HYPOXEMIA ASSOCIATED WITH SLEEP: ICD-10-CM

## 2024-04-24 PROCEDURE — 94762 N-INVAS EAR/PLS OXIMTRY CONT: CPT

## 2024-04-24 NOTE — PROGRESS NOTES
verbalized understanding of the above information and denied further questions or concerns.    The total time spent with the pt was 33 minutes.    Charges dropped No. If no, due to done over the phone.

## 2024-04-24 NOTE — PROGRESS NOTES
Instructions were given for an overnight nocturnal pulse oximetry study. The assigned GBA number of the pulse oximetry was [id number].  A log sheet was completed. Patient was instructed on documenting any events that occurred throughout the night on the log sheet. The procedure was explained to the learner(s). Patient understanding of the procedure was good.     Patient does have a mean of transportation to bring back the study the next day. A patient task was placed in the patient’s chart for the  to download the nocturnal study and fax the results to the ordering provider for interpretation. The pulse oximetry’s memory was cleared. Patient had no questions and was sent home with the pulse oximeter.

## 2024-04-26 NOTE — RESULT ENCOUNTER NOTE
Patient did not have significant desaturation on oxygen at 2 lpm. I will discuss with him at upcoming visit.

## 2024-04-26 NOTE — PROGRESS NOTES
[facility]    Patient: PARRISH MUELLER, 53 y.o.   : 1970      Patient of Dr. Garrido     Assessment/Plan   1. Chronic bronchitis, unspecified chronic bronchitis type (HCC)  - Full PFT Study With Bronchodilator; Future - complete prior to next visit  -Continue Budeson-Glycopyrrol-Formoterol (BREZTRI AEROSPHERE) 160-9-4.8 MCG/ACT AERO; Inhale 2 puffs into the lungs in the morning and at bedtime    -Continue albuterol inhaler PRN for shortness of breath, wheezing  -Reviewed preventative vaccinations    2. Decreased diffusion capacity of lung  - CT CHEST HIGH RESOLUTION; Future - will replace lung screening for next year  - Full PFT Study With Bronchodilator; Future    3. Smoker  -Continue chantix as prescribed and continue to wean off of cigarettes. He was congratulated on his success so far in weaning.   -Discussed with patient smoking cessation techniques including patches and gum patient reports has used in the past and did help, reinforced to patient the importance of quitting smoking to prevent further damage to lung function, patient verbalized understanding. (Approximately 5 mins)   - CT CHEST HIGH RESOLUTION; Future  - Full PFT Study With Bronchodilator; Future         Advised patient to call office with any changes, questions, or concerns regarding respiratory status or issues with prescribed medications    Return in about 1 year (around 2025) for chronic bronchitis .     Subjective     Chief Complaint   Patient presents with    Follow-up     3 month pulmonary med check        HPI  Parrish is here for follow up for shortness of breath, chronic bronchitis, and decreased diffusion capacity. He has no respiratory complaints today. He is an active smoker of 7 cigarettes. He was referred at his last appt to [facility] smoking cessation program and has completed 4 visits with them. Currently taking chantix. His recent overnight pulse oximetry on 2 lpm showed no

## 2024-04-29 ENCOUNTER — OFFICE VISIT (OUTPATIENT)
Dept: PULMONOLOGY | Age: 54
End: 2024-04-29
Payer: COMMERCIAL

## 2024-04-29 VITALS
HEIGHT: 67 IN | TEMPERATURE: 98.7 F | HEART RATE: 70 BPM | DIASTOLIC BLOOD PRESSURE: 74 MMHG | WEIGHT: 115.8 LBS | SYSTOLIC BLOOD PRESSURE: 130 MMHG | OXYGEN SATURATION: 98 % | BODY MASS INDEX: 18.18 KG/M2

## 2024-04-29 DIAGNOSIS — F17.200 SMOKER: ICD-10-CM

## 2024-04-29 DIAGNOSIS — J42 CHRONIC BRONCHITIS, UNSPECIFIED CHRONIC BRONCHITIS TYPE (HCC): Primary | ICD-10-CM

## 2024-04-29 DIAGNOSIS — R94.2 DECREASED DIFFUSION CAPACITY OF LUNG: ICD-10-CM

## 2024-04-29 PROCEDURE — 99406 BEHAV CHNG SMOKING 3-10 MIN: CPT

## 2024-04-29 PROCEDURE — 99214 OFFICE O/P EST MOD 30 MIN: CPT

## 2024-04-29 RX ORDER — BUDESONIDE, GLYCOPYRROLATE, AND FORMOTEROL FUMARATE 160; 9; 4.8 UG/1; UG/1; UG/1
2 AEROSOL, METERED RESPIRATORY (INHALATION) 2 TIMES DAILY
Qty: 2 EACH | Refills: 5 | Status: SHIPPED | OUTPATIENT
Start: 2024-04-29

## 2024-04-29 ASSESSMENT — ENCOUNTER SYMPTOMS
CHEST TIGHTNESS: 0
SHORTNESS OF BREATH: 0
SINUS PRESSURE: 0
RHINORRHEA: 0
WHEEZING: 0
COUGH: 0
SINUS PAIN: 0

## 2024-05-08 ENCOUNTER — TELEPHONE (OUTPATIENT)
Dept: PHARMACY | Age: 54
End: 2024-05-08

## 2024-05-08 NOTE — TELEPHONE ENCOUNTER
Called pt at this time for our scheduled phone appt.  There was no answer so I left a message asking the pt to call us back at [phone], option 2.

## 2024-07-26 RX ORDER — ATORVASTATIN CALCIUM 40 MG/1
40 TABLET, FILM COATED ORAL DAILY
Qty: 90 TABLET | Refills: 0 | OUTPATIENT
Start: 2024-07-26

## 2024-08-26 ENCOUNTER — TELEPHONE (OUTPATIENT)
Dept: PULMONOLOGY | Age: 54
End: 2024-08-26

## 2024-08-26 DIAGNOSIS — G47.36 HYPOXEMIA ASSOCIATED WITH SLEEP: Primary | ICD-10-CM

## 2024-08-26 NOTE — TELEPHONE ENCOUNTER
Pt called today and is questioning whether he still needs the O2 at night.  Had asked Dr Shafer to D/C but he was told he needs to talk to you. I informed him he would probably have to have testing to find out. Please advise. Next appt with you is in April.

## 2024-08-27 NOTE — TELEPHONE ENCOUNTER
Called Pt and he wanted a Friday.  He is also waiting for [facility] to transfer his O2 from old residence to his new one.

## 2024-08-28 NOTE — TELEPHONE ENCOUNTER
Informed Pt of his time for this Friday and let him know DME should be getting in touch in reference to his O2.

## 2024-08-29 NOTE — TELEPHONE ENCOUNTER
Patient called office requesting to reschedule his overnight pulse oximetry. I transferred him to [facility] central scheduling.

## 2024-09-04 ENCOUNTER — OFFICE VISIT (OUTPATIENT)
Dept: INTERNAL MEDICINE CLINIC | Age: 54
End: 2024-09-04
Payer: COMMERCIAL

## 2024-09-04 ENCOUNTER — HOSPITAL ENCOUNTER (OUTPATIENT)
Dept: RESPIRATORY THERAPY | Age: 54
Discharge: HOME | End: 2024-09-04

## 2024-09-04 VITALS
SYSTOLIC BLOOD PRESSURE: 104 MMHG | BODY MASS INDEX: 17.81 KG/M2 | WEIGHT: 112 LBS | RESPIRATION RATE: 18 BRPM | TEMPERATURE: 97.7 F | HEART RATE: 84 BPM | OXYGEN SATURATION: 96 % | DIASTOLIC BLOOD PRESSURE: 68 MMHG

## 2024-09-04 DIAGNOSIS — K75.81 NASH (NONALCOHOLIC STEATOHEPATITIS): Primary | ICD-10-CM

## 2024-09-04 DIAGNOSIS — N20.0 NEPHROLITHIASIS: ICD-10-CM

## 2024-09-04 DIAGNOSIS — J41.0 SIMPLE CHRONIC BRONCHITIS (HCC): ICD-10-CM

## 2024-09-04 DIAGNOSIS — K21.9 GASTROESOPHAGEAL REFLUX DISEASE, UNSPECIFIED WHETHER ESOPHAGITIS PRESENT: ICD-10-CM

## 2024-09-04 DIAGNOSIS — E78.5 HYPERLIPIDEMIA, UNSPECIFIED HYPERLIPIDEMIA TYPE: ICD-10-CM

## 2024-09-04 DIAGNOSIS — K55.1 SMAS (SUPERIOR MESENTERIC ARTERY SYNDROME) (HCC): ICD-10-CM

## 2024-09-04 DIAGNOSIS — G47.34 NOCTURNAL HYPOXEMIA: ICD-10-CM

## 2024-09-04 DIAGNOSIS — F31.31 BIPOLAR AFFECTIVE DISORDER, CURRENTLY DEPRESSED, MILD (HCC): ICD-10-CM

## 2024-09-04 DIAGNOSIS — R10.9 FLANK PAIN: ICD-10-CM

## 2024-09-04 DIAGNOSIS — G47.36 HYPOXEMIA ASSOCIATED WITH SLEEP: ICD-10-CM

## 2024-09-04 DIAGNOSIS — Z72.0 TOBACCO USE: ICD-10-CM

## 2024-09-04 PROCEDURE — 99214 OFFICE O/P EST MOD 30 MIN: CPT

## 2024-09-04 RX ORDER — ALBUTEROL SULFATE 90 UG/1
2 AEROSOL, METERED RESPIRATORY (INHALATION) 4 TIMES DAILY PRN
Qty: 18 G | Refills: 5 | Status: SHIPPED | OUTPATIENT
Start: 2024-09-04

## 2024-09-04 RX ORDER — ATORVASTATIN CALCIUM 40 MG/1
40 TABLET, FILM COATED ORAL DAILY
Qty: 90 TABLET | Refills: 1 | Status: SHIPPED | OUTPATIENT
Start: 2024-09-04

## 2024-09-04 NOTE — PATIENT INSTRUCTIONS
Refills sent for inhaler, cholesterol pill, and acid reflux pill.  Follow up on overnight oxygen testing with lung doctors to see if you still need oxygen at night  A test was ordered to see blood flow through your kidney like you had done 11 years ago to make sure your lower back pain isn't related to your previous bypass procedure  A test was ordered to check your urine and your tendency to make urine stones.

## 2024-09-04 NOTE — PROGRESS NOTES
Instructions were given for an overnight nocturnal pulse oximetry study. The assigned GBA number of the pulse oximetry was [id number].  A log sheet was completed. Patient was instructed on documenting any events that occurred throughout the night on the log sheet. The procedure was explained to the learner(s). Patient understanding of the procedure was good.     Patient does have a mean of transportation to bring back the study the next day. A patient task was placed in the patient’s chart for the  to download the nocturnal study and fax the results to the ordering provider for interpretation. The pulse oximetry’s memory was cleared. Patient had no questions and was sent home with the pulse oximeter.

## 2024-09-04 NOTE — PROGRESS NOTES
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  The patient was last seen and evaluated in the clinic on 2024.  At that encounter, the patient had a low-dose annual CT scan ordered secondary to his tobacco use history.  He additionally had a stress test ordered in the context of his substernal chest pain at rest.  The patient was additionally encouraged to follow-up with GI secondary to his GERD as well as podiatry for left foot wound.  The patient additionally had GGT testing performed secondary to an elevated alkaline phosphatase.    Since the patient's last encounter:  04/10/2024 smoking cessation clinic: Patient was attempting to decrease smoking use from 1 pack/day to 0.5 pack/day  2024 telephone encounter to discuss elevated alkaline phosphatase and GGT testing  04/15/2024 pulmonology telephone encounter secondary to patient requesting oxygen to be increased as he said \"he feels like it is not enough, so wanted to know if he could increase it and see if it helps.\"  Patient was recommended to consider performing overnight pulse oximetry.  2024 smoking cessation clinic: Patient was attempting to decrease smoking use from 1 pack/day to 8 cigarettes a day.  Patient was noted to be compliant and benefiting from Chantix.  2020 for pulmonology encounter.  Patient endorses sensation of heart pounding when awakening in the morning.  Patient was given a prescription for budesonide-glycopyrrolate-formoterol as well as follow-up pulmonary function testing and a high-res CT chest for 1 year.  2024 possible gastroenterology encounter for \"stomach ache.\"  No additional information available for

## 2024-09-05 DIAGNOSIS — K55.1 SMAS (SUPERIOR MESENTERIC ARTERY SYNDROME) (HCC): Primary | ICD-10-CM

## 2024-09-05 DIAGNOSIS — R10.9 FLANK PAIN: ICD-10-CM

## 2024-09-09 ENCOUNTER — TELEPHONE (OUTPATIENT)
Dept: PULMONOLOGY | Age: 54
End: 2024-09-09

## 2024-10-08 ENCOUNTER — TELEPHONE (OUTPATIENT)
Dept: INTERNAL MEDICINE CLINIC | Age: 54
End: 2024-10-08

## 2024-10-08 NOTE — TELEPHONE ENCOUNTER
Pt calling in for 24 hour urine results. Pt is also questioning the service dog paperwork for his apartment. Please review and advise

## 2025-04-21 ENCOUNTER — TELEPHONE (OUTPATIENT)
Dept: PULMONOLOGY | Age: 55
End: 2025-04-21

## 2025-04-21 NOTE — TELEPHONE ENCOUNTER
Central scheduling called in asking if you can update this patients CT and PFT order, as he is currently scheduled 5.9.25. Please advise

## 2025-04-29 ENCOUNTER — TELEPHONE (OUTPATIENT)
Dept: PULMONOLOGY | Age: 55
End: 2025-04-29

## 2025-04-29 DIAGNOSIS — F17.200 SMOKER: ICD-10-CM

## 2025-04-29 DIAGNOSIS — J42 CHRONIC BRONCHITIS, UNSPECIFIED CHRONIC BRONCHITIS TYPE (HCC): Primary | ICD-10-CM

## 2025-04-29 DIAGNOSIS — R94.2 DECREASED DIFFUSION CAPACITY OF LUNG: ICD-10-CM

## 2025-04-29 NOTE — TELEPHONE ENCOUNTER
Central shira calling stating they need to have new orders for Ct and pft they are . Pt is getting the testing done on . Please advise.  I also called patient to get an appt to follow up after testing. Looks like pt no show testing on 4/15 and then canceled follow up on .